# Patient Record
Sex: FEMALE | Race: WHITE | Employment: OTHER | URBAN - METROPOLITAN AREA
[De-identification: names, ages, dates, MRNs, and addresses within clinical notes are randomized per-mention and may not be internally consistent; named-entity substitution may affect disease eponyms.]

---

## 2017-01-10 ENCOUNTER — APPOINTMENT (OUTPATIENT)
Dept: ULTRASOUND IMAGING | Age: 22
End: 2017-01-10
Attending: EMERGENCY MEDICINE
Payer: MEDICAID

## 2017-01-10 ENCOUNTER — HOSPITAL ENCOUNTER (EMERGENCY)
Age: 22
Discharge: HOME | End: 2017-01-10
Attending: EMERGENCY MEDICINE
Payer: MEDICAID

## 2017-01-10 VITALS
SYSTOLIC BLOOD PRESSURE: 128 MMHG | RESPIRATION RATE: 16 BRPM | TEMPERATURE: 98.5 F | HEIGHT: 65 IN | DIASTOLIC BLOOD PRESSURE: 68 MMHG | OXYGEN SATURATION: 98 % | HEART RATE: 86 BPM | WEIGHT: 160 LBS | BODY MASS INDEX: 26.66 KG/M2

## 2017-01-10 DIAGNOSIS — R10.9 LEFT FLANK PAIN: ICD-10-CM

## 2017-01-10 DIAGNOSIS — N73.9 PELVIC INFLAMMATORY DISEASE: Primary | ICD-10-CM

## 2017-01-10 LAB
ALBUMIN SERPL BCP-MCNC: 3.9 G/DL (ref 3.5–5)
ALBUMIN/GLOB SERPL: 1.2 {RATIO} (ref 1.2–3.5)
ALP SERPL-CCNC: 76 U/L (ref 50–136)
ALT SERPL-CCNC: 24 U/L (ref 12–65)
ANION GAP BLD CALC-SCNC: 9 MMOL/L (ref 7–16)
AST SERPL W P-5'-P-CCNC: 13 U/L (ref 15–37)
BACTERIA URNS QL MICRO: 0 /HPF
BASOPHILS # BLD AUTO: 0 K/UL (ref 0–0.2)
BASOPHILS # BLD: 0 % (ref 0–2)
BILIRUB SERPL-MCNC: 0.3 MG/DL (ref 0.2–1.1)
BUN SERPL-MCNC: 11 MG/DL (ref 6–23)
CALCIUM SERPL-MCNC: 8.7 MG/DL (ref 8.3–10.4)
CASTS URNS QL MICRO: 0 /LPF
CHLORIDE SERPL-SCNC: 107 MMOL/L (ref 98–107)
CO2 SERPL-SCNC: 27 MMOL/L (ref 21–32)
CREAT SERPL-MCNC: 0.71 MG/DL (ref 0.6–1)
CRYSTALS URNS QL MICRO: 0 /LPF
DIFFERENTIAL METHOD BLD: ABNORMAL
EOSINOPHIL # BLD: 0.2 K/UL (ref 0–0.8)
EOSINOPHIL NFR BLD: 4 % (ref 0.5–7.8)
EPI CELLS #/AREA URNS HPF: NORMAL /HPF
ERYTHROCYTE [DISTWIDTH] IN BLOOD BY AUTOMATED COUNT: 13.8 % (ref 11.9–14.6)
GLOBULIN SER CALC-MCNC: 3.3 G/DL (ref 2.3–3.5)
GLUCOSE SERPL-MCNC: 96 MG/DL (ref 65–100)
HCT VFR BLD AUTO: 40.2 % (ref 35.8–46.3)
HGB BLD-MCNC: 13.6 G/DL (ref 11.7–15.4)
IMM GRANULOCYTES # BLD: 0 K/UL (ref 0–0.5)
IMM GRANULOCYTES NFR BLD AUTO: 0.6 % (ref 0–5)
LYMPHOCYTES # BLD AUTO: 36 % (ref 13–44)
LYMPHOCYTES # BLD: 1.9 K/UL (ref 0.5–4.6)
MCH RBC QN AUTO: 28.1 PG (ref 26.1–32.9)
MCHC RBC AUTO-ENTMCNC: 33.8 G/DL (ref 31.4–35)
MCV RBC AUTO: 83.1 FL (ref 79.6–97.8)
MONOCYTES # BLD: 0.3 K/UL (ref 0.1–1.3)
MONOCYTES NFR BLD AUTO: 6 % (ref 4–12)
MUCOUS THREADS URNS QL MICRO: 0 /LPF
NEUTS SEG # BLD: 2.8 K/UL (ref 1.7–8.2)
NEUTS SEG NFR BLD AUTO: 53 % (ref 43–78)
PLATELET # BLD AUTO: 246 K/UL (ref 150–450)
PMV BLD AUTO: 9.7 FL (ref 10.8–14.1)
POTASSIUM SERPL-SCNC: 3.8 MMOL/L (ref 3.5–5.1)
PROT SERPL-MCNC: 7.2 G/DL (ref 6.3–8.2)
RBC # BLD AUTO: 4.84 M/UL (ref 4.05–5.25)
RBC #/AREA URNS HPF: NORMAL /HPF
SERVICE CMNT-IMP: NORMAL
SODIUM SERPL-SCNC: 143 MMOL/L (ref 136–145)
WBC # BLD AUTO: 5.3 K/UL (ref 4.3–11.1)
WBC URNS QL MICRO: NORMAL /HPF
WET PREP GENITAL: NORMAL
WET PREP GENITAL: NORMAL

## 2017-01-10 PROCEDURE — 85025 COMPLETE CBC W/AUTO DIFF WBC: CPT | Performed by: EMERGENCY MEDICINE

## 2017-01-10 PROCEDURE — 74011250636 HC RX REV CODE- 250/636: Performed by: EMERGENCY MEDICINE

## 2017-01-10 PROCEDURE — 87491 CHLMYD TRACH DNA AMP PROBE: CPT | Performed by: EMERGENCY MEDICINE

## 2017-01-10 PROCEDURE — 99284 EMERGENCY DEPT VISIT MOD MDM: CPT | Performed by: EMERGENCY MEDICINE

## 2017-01-10 PROCEDURE — 74011000250 HC RX REV CODE- 250: Performed by: EMERGENCY MEDICINE

## 2017-01-10 PROCEDURE — 80053 COMPREHEN METABOLIC PANEL: CPT | Performed by: EMERGENCY MEDICINE

## 2017-01-10 PROCEDURE — 74011250637 HC RX REV CODE- 250/637: Performed by: EMERGENCY MEDICINE

## 2017-01-10 PROCEDURE — 36415 COLL VENOUS BLD VENIPUNCTURE: CPT | Performed by: EMERGENCY MEDICINE

## 2017-01-10 PROCEDURE — 76856 US EXAM PELVIC COMPLETE: CPT

## 2017-01-10 PROCEDURE — 81015 MICROSCOPIC EXAM OF URINE: CPT | Performed by: EMERGENCY MEDICINE

## 2017-01-10 PROCEDURE — 87210 SMEAR WET MOUNT SALINE/INK: CPT | Performed by: EMERGENCY MEDICINE

## 2017-01-10 PROCEDURE — 96372 THER/PROPH/DIAG INJ SC/IM: CPT | Performed by: EMERGENCY MEDICINE

## 2017-01-10 RX ORDER — TRAMADOL HYDROCHLORIDE 50 MG/1
50 TABLET ORAL
Qty: 20 TAB | Refills: 0 | Status: SHIPPED | OUTPATIENT
Start: 2017-01-10 | End: 2018-04-13 | Stop reason: ALTCHOICE

## 2017-01-10 RX ORDER — SODIUM CHLORIDE 0.9 % (FLUSH) 0.9 %
5-10 SYRINGE (ML) INJECTION EVERY 8 HOURS
Status: DISCONTINUED | OUTPATIENT
Start: 2017-01-10 | End: 2017-01-10 | Stop reason: HOSPADM

## 2017-01-10 RX ORDER — SODIUM CHLORIDE 0.9 % (FLUSH) 0.9 %
5-10 SYRINGE (ML) INJECTION AS NEEDED
Status: DISCONTINUED | OUTPATIENT
Start: 2017-01-10 | End: 2017-01-10 | Stop reason: HOSPADM

## 2017-01-10 RX ORDER — KETOROLAC TROMETHAMINE 30 MG/ML
30 INJECTION, SOLUTION INTRAMUSCULAR; INTRAVENOUS
Status: COMPLETED | OUTPATIENT
Start: 2017-01-10 | End: 2017-01-10

## 2017-01-10 RX ORDER — AZITHROMYCIN 250 MG/1
1000 TABLET, FILM COATED ORAL
Status: COMPLETED | OUTPATIENT
Start: 2017-01-10 | End: 2017-01-10

## 2017-01-10 RX ORDER — ONDANSETRON 8 MG/1
8 TABLET, ORALLY DISINTEGRATING ORAL
Status: COMPLETED | OUTPATIENT
Start: 2017-01-10 | End: 2017-01-10

## 2017-01-10 RX ADMIN — ONDANSETRON 8 MG: 8 TABLET, ORALLY DISINTEGRATING ORAL at 10:00

## 2017-01-10 RX ADMIN — CEFTRIAXONE SODIUM 250 MG: 250 INJECTION, POWDER, FOR SOLUTION INTRAMUSCULAR; INTRAVENOUS at 10:00

## 2017-01-10 RX ADMIN — AZITHROMYCIN 1000 MG: 250 TABLET, FILM COATED ORAL at 10:00

## 2017-01-10 RX ADMIN — KETOROLAC TROMETHAMINE 30 MG: 30 INJECTION, SOLUTION INTRAMUSCULAR; INTRAVENOUS at 10:03

## 2017-01-10 NOTE — ED TRIAGE NOTES
Pt arrives POV from home c/o L flank pain since this morning. Pt reports ongoing abd pain x 1 month and recent tx for PID. Denies hematuria.

## 2017-01-10 NOTE — ED PROVIDER NOTES
Patient is a 19 y.o. female presenting with flank pain. The history is provided by the patient. Flank Pain    This is a new problem. The current episode started 6 to 12 hours ago. The problem has not changed since onset. The problem occurs constantly. Patient reports not work related injury. The pain is associated with no known injury. The pain is present in the left side. The quality of the pain is described as sharp. The pain does not radiate. The pain is moderate. The symptoms are aggravated by certain positions. Associated symptoms include abdominal pain (chronic for several weeks). Pertinent negatives include no fever and no dysuria. Treatments tried: recently for PID. History reviewed. No pertinent past medical history. History reviewed. No pertinent past surgical history. History reviewed. No pertinent family history. Social History     Social History    Marital status: SINGLE     Spouse name: N/A    Number of children: N/A    Years of education: N/A     Occupational History    Not on file. Social History Main Topics    Smoking status: Current Every Day Smoker     Packs/day: 0.50    Smokeless tobacco: Not on file    Alcohol use Yes      Comment: occasional    Drug use: Not on file    Sexual activity: No     Other Topics Concern    Not on file     Social History Narrative    No narrative on file         ALLERGIES: Review of patient's allergies indicates no known allergies. Review of Systems   Constitutional: Negative for chills and fever. Gastrointestinal: Positive for abdominal pain (chronic for several weeks) and nausea. Negative for blood in stool and vomiting. Endocrine: Negative for polydipsia and polyuria. Genitourinary: Positive for flank pain. Negative for dysuria, hematuria and urgency.        Vitals:    01/10/17 0819   BP: 130/60   Pulse: 91   Resp: 16   Temp: 98.5 °F (36.9 °C)   SpO2: 99%   Weight: 72.6 kg (160 lb)   Height: 5' 5\" (1.651 m) Physical Exam   Constitutional: She appears well-developed and well-nourished. HENT:   Mouth/Throat: Oropharynx is clear and moist.   Eyes: Conjunctivae are normal.   Cardiovascular: Normal rate, regular rhythm, normal heart sounds and intact distal pulses. No murmur heard. Pulmonary/Chest: Breath sounds normal. No respiratory distress. Abdominal: Soft. She exhibits no distension. There is no tenderness. There is no rebound and no guarding. Musculoskeletal: Normal range of motion. She exhibits tenderness (left CVA and leftupper lumbar and lower thoracic paraspinous tenderness and pain noted with movement. ). She exhibits no edema. Neurological: She is alert. She has normal strength. No sensory deficit. Skin: Skin is warm and dry. Nursing note and vitals reviewed. MDM  Number of Diagnoses or Management Options  Diagnosis management comments: Subacute or chronic lower abdominal pain and more acute left-sided flank pain. Clinically patient had symptoms consistent with PID with a yellow cervical discharge and diffuse adnexal and pelvic tenderness and cervical motion tenderness. Patient was treated in the emergency department. No tubo-ovarian abscess was noted on ultrasound. Lonzell Balls was in the anatomical place in the uterus. There was no ovarian torsion.   No ovarian cyst.  The patient will need to follow up with her OB/GYN for consideration of Mirena IUD removal.       Amount and/or Complexity of Data Reviewed  Clinical lab tests: ordered and reviewed (Results for orders placed or performed during the hospital encounter of 01/10/17  -WET PREP       Result                                            Value                         Ref Range                       Special Requests:                                 NO SPECIAL REQUESTS                                           Wet prep                                                                                                    NO YEAST,TRICHOMONAS OR CLUE CELLS NOTED       Wet prep                                          0 TO 7 WBC'S PER HPF                                     -CBC WITH AUTOMATED DIFF       Result                                            Value                         Ref Range                       WBC                                               5.3                           4.3 - 11.1 K/uL                 RBC                                               4.84                          4.05 - 5.25 M/uL                HGB                                               13.6                          11.7 - 15.4 g/dL                HCT                                               40.2                          35.8 - 46.3 %                   MCV                                               83.1                          79.6 - 97.8 FL                  MCH                                               28.1                          26.1 - 32.9 PG                  MCHC                                              33.8                          31.4 - 35.0 g/dL                RDW                                               13.8                          11.9 - 14.6 %                   PLATELET                                          246                           150 - 450 K/uL                  MPV                                               9.7 (L)                       10.8 - 14.1 FL                  DF                                                AUTOMATED                                                     NEUTROPHILS                                       53                            43 - 78 %                       LYMPHOCYTES                                       36                            13 - 44 %                       MONOCYTES                                         6                             4.0 - 12.0 %                    EOSINOPHILS                                       4                             0.5 - 7.8 %                     BASOPHILS                                         0                             0.0 - 2.0 %                     IMMATURE GRANULOCYTES                             0.6                           0.0 - 5.0 %                     ABS. NEUTROPHILS                                  2.8                           1.7 - 8.2 K/UL                  ABS. LYMPHOCYTES                                  1.9                           0.5 - 4.6 K/UL                  ABS. MONOCYTES                                    0.3                           0.1 - 1.3 K/UL                  ABS. EOSINOPHILS                                  0.2                           0.0 - 0.8 K/UL                  ABS. BASOPHILS                                    0.0                           0.0 - 0.2 K/UL                  ABS. IMM.  GRANS.                                  0.0                           0.0 - 0.5 K/UL             -METABOLIC PANEL, COMPREHENSIVE       Result                                            Value                         Ref Range                       Sodium                                            143                           136 - 145 mmol/L                Potassium                                         3.8                           3.5 - 5.1 mmol/L                Chloride                                          107                           98 - 107 mmol/L                 CO2                                               27                            21 - 32 mmol/L                  Anion gap                                         9                             7 - 16 mmol/L                   Glucose                                           96                            65 - 100 mg/dL                  BUN                                               11                            6 - 23 MG/DL                    Creatinine                                        0.71                          0.6 - 1.0 MG/DL GFR est AA                                        >60                           >60 ml/min/1.73m2               GFR est non-AA                                    >60                           >60 ml/min/1.73m2               Calcium                                           8.7                           8.3 - 10.4 MG/DL                Bilirubin, total                                  0.3                           0.2 - 1.1 MG/DL                 ALT                                               24                            12 - 65 U/L                     AST                                               13 (L)                        15 - 37 U/L                     Alk. phosphatase                                  76                            50 - 136 U/L                    Protein, total                                    7.2                           6.3 - 8.2 g/dL                  Albumin                                           3.9                           3.5 - 5.0 g/dL                  Globulin                                          3.3                           2.3 - 3.5 g/dL                  A-G Ratio                                         1.2                           1.2 - 3.5                  -URINE MICROSCOPIC       Result                                            Value                         Ref Range                       WBC                                               5-10                          0 /hpf                          RBC                                               3-5                           0 /hpf                          Epithelial cells                                  3-5                           0 /hpf                          Bacteria                                          0                             0 /hpf                          Casts                                             0                             0 /lpf                          Crystals 0                             0 /LPF                          Mucus                                             0                             0 /lpf                     )  Tests in the radiology section of CPT®: ordered and reviewed (Us Pelv Non Ob W Tv    Result Date: 1/10/2017  Examination: Transvaginal ultrasound of the pelvis for optimal visualization of the ovaries and uterus. Transabdominal ultrasound for evaluation of the remainder of the pelvic contents. History: pelvic, LLQ abd pain, PID-TOA? Ovarian torsion or cyst?, 21 years Female; Patient's last menstrual period was January 01, 2017. Negative beta hCG. Pelvic pain greater on the left. Comparison: None available Findings: Uterus measures approximately 9.9 x 4.0 x 5.6 cm. Myometrium has a background normal uniform echotexture. Endometrial stripe measures approximately 0.6 cm. An intrauterine device appears in anatomic position within the endometrial cavity. The right and left ovary were well-visualized. Right ovary measures approximately 3.9 x 1.5 x 2.5 cm, and left ovary measures approximately 2.8 x 3.7 x 1.7 cm. Normal Doppler flow in bilateral ovaries. No free fluid was seen within the cul-de-sac. Impression:  Intrauterine device appears in anatomic position within the endometrial cavity. Otherwise normal pelvic ultrasound.       )      ED Course       Procedures

## 2017-01-10 NOTE — ED NOTES
This RN to assume care of this pt. Pt resting in bed at this time. NO acute distress noted. Pt filled her bladder and feels need to urinate. To go to [facility] as soon as they come for her.

## 2017-01-10 NOTE — DISCHARGE INSTRUCTIONS
Pelvic Inflammatory Disease: Care Instructions  Your Care Instructions    Pelvic inflammatory disease, or PID, is an infection of a womans fallopian tubes and other reproductive organs. PID is usually caused by a sexually transmitted infection (STI), such as gonorrhea or chlamydia. PID can cause scars in the fallopian tubes, making it hard for a woman to get pregnant. Having one STI increases your risk for other STIs, such as genital herpes, genital warts, syphilis, and HIV. It is important to take all the medicine that was prescribed. PID can cause serious health problems if you do not complete your treatment. Follow-up care is a key part of your treatment and safety. Be sure to make and go to all appointments, and call your doctor if you are having problems. Its also a good idea to know your test results and keep a list of the medicines you take. How can you care for yourself at home? · Take your antibiotics as directed. Do not stop taking them just because you feel better. You need to take the full course of antibiotics. · Rest until your fever and pain have improved. · Take pain medicines exactly as directed. ¨ If the doctor gave you a prescription medicine for pain, take it as prescribed. ¨ If you are not taking a prescription pain medicine, ask your doctor if you can take an over-the-counter medicine. · Use a hot water bottle or a heating pad (set on low) on your belly for pain. · Do not douche. · Do not have sex or use tampons (you can use pads instead) until you have taken all the medicine, your pain is gone, and you feel completely well. · Talk to any sex partners you have had in the past 2 months. They need to be tested and may need to be treated for STIs. To prevent STIs  · Use latex condoms every time you have sex. Use them from the beginning to the end of sexual contact. · Talk to your partner before you have sex.  Find out if he or she has or is at risk for any sexually transmitted infection (STI). Keep in mind that a person may be able to spread an STI even if he or she does not have symptoms. · Do not have sex with anyone who has symptoms of an STI, such as sores on the genitals or mouth. · Having one sex partner (who does not have STIs and does not have sex with anyone else) is a good way to avoid STIs. When should you call for help? Call your doctor now or seek immediate medical care if:  · You have a new or higher fever. · Your pain gets worse. · You think you may be pregnant. Watch closely for changes in your health, and be sure to contact your doctor if:  · You vomit or have diarrhea. · You are not getting better after 2 days. Where can you learn more? Go to http://jeferson-jordan.info/. Enter N294 in the search box to learn more about \"Pelvic Inflammatory Disease: Care Instructions. \"  Current as of: February 25, 2016  Content Version: 11.1  © 6706-2426 [employer]. Care instructions adapted under license by [employer] (which disclaims liability or warranty for this information). If you have questions about a medical condition or this instruction, always ask your healthcare professional. [address] any warranty or liability for your use of this information. Abdominal Pain: Care Instructions  Your Care Instructions    Abdominal pain has many possible causes. Some aren't serious and get better on their own in a few days. Others need more testing and treatment. If your pain continues or gets worse, you need to be rechecked and may need more tests to find out what is wrong. You may need surgery to correct the problem. Don't ignore new symptoms, such as fever, nausea and vomiting, urination problems, pain that gets worse, and dizziness. These may be signs of a more serious problem. Your doctor may have recommended a follow-up visit in the next 8 to 12 hours.  If you are not getting better, you may need more tests or treatment. The doctor has checked you carefully, but problems can develop later. If you notice any problems or new symptoms, get medical treatment right away. Follow-up care is a key part of your treatment and safety. Be sure to make and go to all appointments, and call your doctor if you are having problems. It's also a good idea to know your test results and keep a list of the medicines you take. How can you care for yourself at home? · Rest until you feel better. · To prevent dehydration, drink plenty of fluids, enough so that your urine is light yellow or clear like water. Choose water and other caffeine-free clear liquids until you feel better. If you have kidney, heart, or liver disease and have to limit fluids, talk with your doctor before you increase the amount of fluids you drink. · If your stomach is upset, eat mild foods, such as rice, dry toast or crackers, bananas, and applesauce. Try eating several small meals instead of two or three large ones. · Wait until 48 hours after all symptoms have gone away before you have spicy foods, alcohol, and drinks that contain caffeine. · Do not eat foods that are high in fat. · Avoid anti-inflammatory medicines such as aspirin, ibuprofen (Advil, Motrin), and naproxen (Aleve). These can cause stomach upset. Talk to your doctor if you take daily aspirin for another health problem. When should you call for help? Call 911 anytime you think you may need emergency care. For example, call if:  · You passed out (lost consciousness). · You pass maroon or very bloody stools. · You vomit blood or what looks like coffee grounds. · You have new, severe belly pain. Call your doctor now or seek immediate medical care if:  · Your pain gets worse, especially if it becomes focused in one area of your belly. · You have a new or higher fever. · Your stools are black and look like tar, or they have streaks of blood.   · You have unexpected vaginal bleeding. · You have symptoms of a urinary tract infection. These may include:  ¨ Pain when you urinate. ¨ Urinating more often than usual.  ¨ Blood in your urine. · You are dizzy or lightheaded, or you feel like you may faint. Watch closely for changes in your health, and be sure to contact your doctor if:  · You are not getting better after 1 day (24 hours). Where can you learn more? Go to http://jeferson-jordan.info/. Enter D715 in the search box to learn more about \"Abdominal Pain: Care Instructions. \"  Current as of: May 27, 2016  Content Version: 11.1  © 3030-7772 [employer]. Care instructions adapted under license by [employer] (which disclaims liability or warranty for this information). If you have questions about a medical condition or this instruction, always ask your healthcare professional. [facility] [address] any warranty or liability for your use of this information.

## 2017-01-10 NOTE — LETTER
NOTIFICATION RETURN TO WORK / SCHOOL 
 
1/11/2017 2:27 PM 
 
Ms. Janny Mackenzie [address] [facility] [id number] To Whom It May Concern: 
 
Janny Mackenzie is currently under the care of [facility] EMERGENCY DEPT 1/10/2016. She will return to work/school on: 1/12/2016 If there are questions or concerns please have the patient contact our office. Sincerely, Esperanza Cooney RN

## 2017-01-11 LAB
C TRACH RRNA SPEC QL NAA+PROBE: NEGATIVE
N GONORRHOEA RRNA SPEC QL NAA+PROBE: NEGATIVE
SPECIMEN SOURCE: NORMAL

## 2018-04-13 PROBLEM — F41.1 GAD (GENERALIZED ANXIETY DISORDER): Status: ACTIVE | Noted: 2018-04-13

## 2018-04-13 PROBLEM — F33.9 DEPRESSION, RECURRENT (HCC): Status: ACTIVE | Noted: 2018-04-13

## 2018-04-13 PROBLEM — Z00.00 ANNUAL PHYSICAL EXAM: Status: ACTIVE | Noted: 2018-04-13

## 2018-07-02 PROBLEM — Z30.9 CONTRACEPTION MANAGEMENT: Status: ACTIVE | Noted: 2018-07-02

## 2018-07-02 PROBLEM — G47.00 INSOMNIA: Status: ACTIVE | Noted: 2018-07-02

## 2019-05-09 PROBLEM — R10.9 CHRONIC ABDOMINAL PAIN: Status: ACTIVE | Noted: 2019-05-09

## 2019-05-09 PROBLEM — G89.29 CHRONIC ABDOMINAL PAIN: Status: ACTIVE | Noted: 2019-05-09

## 2019-05-09 PROBLEM — K52.9 CHRONIC DIARRHEA: Status: ACTIVE | Noted: 2019-05-09

## 2019-07-17 PROBLEM — G54.0 THORACIC OUTLET SYNDROME: Status: ACTIVE | Noted: 2019-07-17

## 2020-04-07 PROBLEM — F41.0 PANIC ANXIETY SYNDROME: Status: ACTIVE | Noted: 2018-04-13

## 2020-04-07 PROBLEM — K21.9 GERD (GASTROESOPHAGEAL REFLUX DISEASE): Status: ACTIVE | Noted: 2020-04-07

## 2020-04-07 PROBLEM — F39 MOOD DISORDER (HCC): Status: ACTIVE | Noted: 2018-04-13

## 2020-12-02 PROBLEM — F32.9 MDD (MAJOR DEPRESSIVE DISORDER): Status: ACTIVE | Noted: 2018-04-13

## 2021-07-07 PROBLEM — R11.0 CHRONIC NAUSEA: Status: ACTIVE | Noted: 2021-07-07

## 2021-07-22 PROBLEM — Z30.9 CONTRACEPTION MANAGEMENT: Status: RESOLVED | Noted: 2018-07-02 | Resolved: 2021-07-22

## 2021-07-22 PROBLEM — L72.0 EPIDERMAL CYST: Status: ACTIVE | Noted: 2021-07-22

## 2021-07-22 PROBLEM — Z01.419 ENCOUNTER FOR ANNUAL ROUTINE GYNECOLOGICAL EXAMINATION: Status: ACTIVE | Noted: 2021-07-22

## 2021-07-22 PROBLEM — R35.0 URINARY FREQUENCY: Status: ACTIVE | Noted: 2021-07-22

## 2021-08-04 ENCOUNTER — TRANSCRIBE ORDER (OUTPATIENT)
Dept: SCHEDULING | Age: 26
End: 2021-08-04

## 2021-08-04 DIAGNOSIS — R10.13 EPIGASTRIC ABDOMINAL PAIN: Primary | ICD-10-CM

## 2021-08-04 DIAGNOSIS — Z83.79 FAMILY HISTORY OF GALLBLADDER DISEASE: ICD-10-CM

## 2021-08-04 DIAGNOSIS — A04.8 HELICOBACTER PYLORI INFECTION: ICD-10-CM

## 2021-08-26 ENCOUNTER — HOSPITAL ENCOUNTER (OUTPATIENT)
Dept: NUCLEAR MEDICINE | Age: 26
Discharge: HOME | End: 2021-08-26
Attending: INTERNAL MEDICINE
Payer: MEDICAID

## 2021-08-26 DIAGNOSIS — Z83.79 FAMILY HISTORY OF GALLBLADDER DISEASE: ICD-10-CM

## 2021-08-26 DIAGNOSIS — A04.8 HELICOBACTER PYLORI INFECTION: ICD-10-CM

## 2021-08-26 DIAGNOSIS — R10.13 EPIGASTRIC ABDOMINAL PAIN: ICD-10-CM

## 2021-08-26 PROCEDURE — 78227 HEPATOBIL SYST IMAGE W/DRUG: CPT

## 2022-01-10 PROBLEM — N89.8 VAGINAL DISCHARGE: Status: ACTIVE | Noted: 2022-01-10

## 2022-01-10 PROBLEM — R82.90 MALODOROUS URINE: Status: ACTIVE | Noted: 2022-01-10

## 2022-03-18 PROBLEM — F32.9 MDD (MAJOR DEPRESSIVE DISORDER): Status: ACTIVE | Noted: 2018-04-13

## 2022-03-18 PROBLEM — N89.8 VAGINAL DISCHARGE: Status: ACTIVE | Noted: 2022-01-10

## 2022-03-18 PROBLEM — Z00.00 ANNUAL PHYSICAL EXAM: Status: ACTIVE | Noted: 2018-04-13

## 2022-03-18 PROBLEM — R35.0 URINARY FREQUENCY: Status: ACTIVE | Noted: 2021-07-22

## 2022-03-18 PROBLEM — K21.9 GERD (GASTROESOPHAGEAL REFLUX DISEASE): Status: ACTIVE | Noted: 2020-04-07

## 2022-03-19 PROBLEM — G89.29 CHRONIC ABDOMINAL PAIN: Status: ACTIVE | Noted: 2019-05-09

## 2022-03-19 PROBLEM — R11.0 CHRONIC NAUSEA: Status: ACTIVE | Noted: 2021-07-07

## 2022-03-19 PROBLEM — L72.0 EPIDERMAL CYST: Status: ACTIVE | Noted: 2021-07-22

## 2022-03-19 PROBLEM — R10.9 CHRONIC ABDOMINAL PAIN: Status: ACTIVE | Noted: 2019-05-09

## 2022-03-19 PROBLEM — F41.0 PANIC ANXIETY SYNDROME: Status: ACTIVE | Noted: 2018-04-13

## 2022-03-19 PROBLEM — Z01.419 ENCOUNTER FOR ANNUAL ROUTINE GYNECOLOGICAL EXAMINATION: Status: ACTIVE | Noted: 2021-07-22

## 2022-03-19 PROBLEM — K52.9 CHRONIC DIARRHEA: Status: ACTIVE | Noted: 2019-05-09

## 2022-03-19 PROBLEM — R82.90 MALODOROUS URINE: Status: ACTIVE | Noted: 2022-01-10

## 2022-03-19 PROBLEM — G54.0 TOS (THORACIC OUTLET SYNDROME): Status: ACTIVE | Noted: 2019-07-17

## 2022-03-20 PROBLEM — G47.00 INSOMNIA: Status: ACTIVE | Noted: 2018-07-02

## 2022-06-09 DIAGNOSIS — Z00.00 ANNUAL PHYSICAL EXAM: Primary | ICD-10-CM

## 2022-08-04 DIAGNOSIS — Z00.00 ANNUAL PHYSICAL EXAM: ICD-10-CM

## 2022-08-24 ENCOUNTER — TELEPHONE (OUTPATIENT)
Dept: FAMILY MEDICINE CLINIC | Facility: CLINIC | Age: 27
End: 2022-08-24

## 2022-08-24 ENCOUNTER — TELEMEDICINE (OUTPATIENT)
Dept: FAMILY MEDICINE CLINIC | Facility: CLINIC | Age: 27
End: 2022-08-24
Payer: MEDICAID

## 2022-08-24 DIAGNOSIS — K52.9 CHRONIC DIARRHEA: ICD-10-CM

## 2022-08-24 DIAGNOSIS — R11.0 CHRONIC NAUSEA: Primary | ICD-10-CM

## 2022-08-24 DIAGNOSIS — G89.29 CHRONIC ABDOMINAL PAIN: ICD-10-CM

## 2022-08-24 DIAGNOSIS — R10.9 CHRONIC ABDOMINAL PAIN: ICD-10-CM

## 2022-08-24 PROBLEM — N89.8 VAGINAL DISCHARGE: Status: RESOLVED | Noted: 2022-01-10 | Resolved: 2022-08-24

## 2022-08-24 PROBLEM — R82.90 MALODOROUS URINE: Status: RESOLVED | Noted: 2022-01-10 | Resolved: 2022-08-24

## 2022-08-24 PROBLEM — R35.0 URINARY FREQUENCY: Status: RESOLVED | Noted: 2021-07-22 | Resolved: 2022-08-24

## 2022-08-24 PROBLEM — Z01.419 ENCOUNTER FOR ANNUAL ROUTINE GYNECOLOGICAL EXAMINATION: Status: RESOLVED | Noted: 2021-07-22 | Resolved: 2022-08-24

## 2022-08-24 PROCEDURE — 99214 OFFICE O/P EST MOD 30 MIN: CPT | Performed by: FAMILY MEDICINE

## 2022-08-24 RX ORDER — PROMETHAZINE HYDROCHLORIDE 12.5 MG/1
25 TABLET ORAL EVERY 8 HOURS PRN
Qty: 90 TABLET | Refills: 1 | Status: SHIPPED | OUTPATIENT
Start: 2022-08-24

## 2022-08-24 RX ORDER — ONDANSETRON 4 MG/1
4 TABLET, FILM COATED ORAL EVERY 8 HOURS PRN
Qty: 90 TABLET | Refills: 1 | Status: SHIPPED | OUTPATIENT
Start: 2022-08-24

## 2022-08-24 ASSESSMENT — PATIENT HEALTH QUESTIONNAIRE - PHQ9
SUM OF ALL RESPONSES TO PHQ QUESTIONS 1-9: 0
2. FEELING DOWN, DEPRESSED OR HOPELESS: 0
SUM OF ALL RESPONSES TO PHQ9 QUESTIONS 1 & 2: 0
SUM OF ALL RESPONSES TO PHQ QUESTIONS 1-9: 0
1. LITTLE INTEREST OR PLEASURE IN DOING THINGS: 0

## 2022-08-24 NOTE — TELEPHONE ENCOUNTER
That is fine; it makes no difference whether she takes two 12.5 mg tablets or one 25 mg tablet; it's the same med & dosage either way.  PLEASE tell pharmacy to just do that & not \"require\" a brand new script for something so inconsequential.

## 2022-08-24 NOTE — TELEPHONE ENCOUNTER
Pharmacy called and stated the insurance will not pay for the promethazine 12.5 mg 2 tabs every 8 hours. Can they change it to 25mg 1 tab every 8 hours. Insurance will pay for that.

## 2022-08-24 NOTE — PROGRESS NOTES
[address], [address] [place] [address]  Phone: [phone]  Fax: [phone]  [email or web address]      Encounter Michael Avila; Established patient 27 y. o.female; seen 8/24/2022 for: Emesis (Since Friday last week. Did get worse today, and woke up with a Head ache and feeling run down.)      Assessment & Plan    1. Chronic nausea  -     ondansetron (ZOFRAN) 4 MG tablet; Take 1 tablet by mouth every 8 hours as needed for Vomiting, Disp-90 tablet, R-1Normal  -     promethazine (PHENERGAN) 12.5 MG tablet; Take 2 tablets by mouth every 8 hours as needed for Nausea, Disp-90 tablet, R-1Normal  2. Chronic diarrhea  3. Chronic abdominal pain    Problem and/or Symptoms are currently not stable and/or well controlled on current treatment plan. Will have patient follow up as directed and make the following changes for further evaluation and/or treatment:     RF Zofran & Phenergan to use TID PRN; advised pt to get rapid COVID & rapid Flu test & to let us know if either are positive. Check Out Instructions  Return for Next Scheduled. Subjective & Objective    HPI  Problem and/or Symptoms are currently not stable and/or well controlled on current treatment plan. Will have patient follow up as directed and make the following changes for further evaluation and/or treatment:     Sx of N/V/D since last Friday; pt reports that there are multiple people at her work out sick right now with similar Sx. No rapid COVID or Flu test results. Does have ab it of a non-productive cough & fatigue/malaise.      Review of Systems     Physical Exam  Patient-Reported Vitals 8/24/2022   Patient-Reported Weight 147   Patient-Reported Height 55       BP Readings from Last 3 Encounters:   04/21/22 104/70   01/10/22 104/70   07/07/21 103/62     Wt Readings from Last 3 Encounters:   04/21/22 161 lb (73 kg)   01/10/22 170 lb (77.1 kg)   07/07/21 161 lb (73 kg)     There is no height or weight on file to calculate BMI. We discussed the typical prognosis and potential complications of the concern(s), including treatment options. Medication risks, benefits, costs, interactions, and alternatives were discussed as appropriate. I advised her to contact the office if her condition worsens or fails to improve as anticipated. She expressed understanding with the discussion and plan of care. Reyna Sweeney, was evaluated through a synchronous (real-time) audio and/or video encounter. The patient (or guardian if applicable) is aware that this is a billable service, which includes applicable co-pays. This Virtual Visit was conducted with patient's (and/or legal guardian's) consent. The visit was conducted pursuant to the emergency declaration under the [address] [place] [address] authority and the [employer] and [employer] General Act. Patient identification was verified, and a caregiver was present when appropriate. The patient was located at Home: [place] [id number]. Provider was located at [facility] (Appt Dept): [id number] [place] [address]. An electronic signature was used to authenticate this note.   -- Michelle Avalos MD

## 2022-08-24 NOTE — LETTER
[address] [address]  Phone: [phone]  Fax: [phone]    Deb Palmer MD        August 24, 2022     Patient: Paul Coronado   YOB: 1995   Date of Visit: 8/24/2022       To Whom It May Concern: It is my medical opinion that [place] may return to work on Thursday, Sept 25th. If you have any questions or concerns, please don't hesitate to call.     Sincerely,        Deb Palmer MD

## 2022-08-29 ENCOUNTER — TELEMEDICINE (OUTPATIENT)
Dept: FAMILY MEDICINE CLINIC | Facility: CLINIC | Age: 27
End: 2022-08-29
Payer: MEDICAID

## 2022-08-29 DIAGNOSIS — G89.29 CHRONIC ABDOMINAL PAIN: Primary | ICD-10-CM

## 2022-08-29 DIAGNOSIS — R11.0 CHRONIC NAUSEA: ICD-10-CM

## 2022-08-29 DIAGNOSIS — R51.9 NONINTRACTABLE HEADACHE, UNSPECIFIED CHRONICITY PATTERN, UNSPECIFIED HEADACHE TYPE: ICD-10-CM

## 2022-08-29 DIAGNOSIS — R10.9 CHRONIC ABDOMINAL PAIN: Primary | ICD-10-CM

## 2022-08-29 DIAGNOSIS — K52.9 CHRONIC DIARRHEA: ICD-10-CM

## 2022-08-29 PROCEDURE — 99214 OFFICE O/P EST MOD 30 MIN: CPT | Performed by: FAMILY MEDICINE

## 2022-08-29 RX ORDER — DIPHENOXYLATE HYDROCHLORIDE AND ATROPINE SULFATE 2.5; .025 MG/1; MG/1
1-2 TABLET ORAL 4 TIMES DAILY PRN
Qty: 80 TABLET | Refills: 1 | Status: SHIPPED | OUTPATIENT
Start: 2022-08-29 | End: 2023-08-30

## 2022-08-29 NOTE — LETTER
[facility] [address]  Phone: [phone]  Fax: [phone]    Ally Corriagn MD        August 29, 2022     Patient: Rolo Mendoza   YOB: 1995   Date of Visit: 8/29/2022       To Whom It May Concern: It is my medical opinion that [place] should work from home up through Monday, August 29th, and return to work in person starting Tuesday, August 30th. If you have any questions or concerns, please don't hesitate to call.     Sincerely,        Ally Corrigan MD

## 2022-08-29 NOTE — PROGRESS NOTES
[facility]  [address] [place] [address], [address] [place] [address]  Phone: [phone]  Fax: [phone]  [email or web address]      Encounter Michael Avila; Established patient 27 y. o.female; seen 8/29/2022 for: Other (Stomach issues (work excuse) already seeing GI )      Assessment & Plan    1. Chronic abdominal pain  2. Chronic diarrhea  -     diphenoxylate-atropine (LOMOTIL) 2.5-0.025 MG per tablet; Take 1-2 tablets by mouth 4 times daily as needed for Diarrhea., Disp-80 tablet, R-1Normal  3. Chronic nausea  4. Nonintractable headache, unspecified chronicity pattern, unspecified headache type    Problem and/or Symptoms are currently not stable and/or well controlled on current treatment plan. Will have patient follow up as directed and make the following changes for further evaluation and/or treatment:     Advised pt to f/u with GI ASAP, and to avoid Zofran for next few days since this can sometimes exacerbate headaches. Also encourage patient to try to increase her fluid intake since dehydration can also worsen headache syndrome. Also providing her with stronger antidiarrheal medication Lomotil and advised her to continue with Phenergan for nausea control for now. Check Out Instructions  Return for Physical, Previsit Labs. Subjective & Objective    HPI  Problem and/or Symptoms are currently not stable and/or well controlled on current treatment plan. Will have patient follow up as directed and make the following changes for further evaluation and/or treatment:     Pt seeing GI but needs a f/u visit; has continued to have GI issues with abdominal pain, N/V, & diarrhea.      Review of Systems     Physical Exam  Patient-Reported Vitals 8/29/2022   Patient-Reported Weight 147   Patient-Reported Height 5'5\"       BP Readings from Last 3 Encounters:   04/21/22 104/70   01/10/22 104/70   07/07/21 103/62     Wt Readings from Last 3 Encounters:   04/21/22 161 lb (73 kg)   01/10/22 170 lb (77.1 kg)   07/07/21 161 lb (73 kg)     There is no height or weight on file to calculate BMI. We discussed the typical prognosis and potential complications of the concern(s), including treatment options. Medication risks, benefits, costs, interactions, and alternatives were discussed as appropriate. I advised her to contact the office if her condition worsens or fails to improve as anticipated. She expressed understanding with the discussion and plan of care. Pablo Garcia, was evaluated through a synchronous (real-time) audio and/or video encounter. The patient (or guardian if applicable) is aware that this is a billable service, which includes applicable co-pays. This Virtual Visit was conducted with patient's (and/or legal guardian's) consent. The visit was conducted pursuant to the emergency declaration under the [address] [place] [address], [address] [place] [address] authority and the [employer] and [employer] General Act. Patient identification was verified, and a caregiver was present when appropriate. The patient was located at Home: [place] [phone]. Provider was located at [facility] (Appt Dept): [id number] [place] [address]. An electronic signature was used to authenticate this note.   -- Danis Garcia MD

## 2022-11-01 RX ORDER — ONDANSETRON 4 MG/1
TABLET, ORALLY DISINTEGRATING ORAL
COMMUNITY
Start: 2022-10-06

## 2022-11-01 SDOH — ECONOMIC STABILITY: FOOD INSECURITY: WITHIN THE PAST 12 MONTHS, THE FOOD YOU BOUGHT JUST DIDN'T LAST AND YOU DIDN'T HAVE MONEY TO GET MORE.: NEVER TRUE

## 2022-11-01 SDOH — ECONOMIC STABILITY: FOOD INSECURITY: WITHIN THE PAST 12 MONTHS, YOU WORRIED THAT YOUR FOOD WOULD RUN OUT BEFORE YOU GOT MONEY TO BUY MORE.: NEVER TRUE

## 2022-11-01 ASSESSMENT — SOCIAL DETERMINANTS OF HEALTH (SDOH): HOW HARD IS IT FOR YOU TO PAY FOR THE VERY BASICS LIKE FOOD, HOUSING, MEDICAL CARE, AND HEATING?: NOT HARD AT ALL

## 2022-11-02 ENCOUNTER — TELEMEDICINE (OUTPATIENT)
Dept: FAMILY MEDICINE CLINIC | Facility: CLINIC | Age: 27
End: 2022-11-02
Payer: MEDICAID

## 2022-11-02 DIAGNOSIS — F33.9 RECURRENT MAJOR DEPRESSIVE DISORDER, REMISSION STATUS UNSPECIFIED (HCC): Primary | ICD-10-CM

## 2022-11-02 DIAGNOSIS — F41.0 PANIC ANXIETY SYNDROME: ICD-10-CM

## 2022-11-02 PROCEDURE — 99214 OFFICE O/P EST MOD 30 MIN: CPT | Performed by: FAMILY MEDICINE

## 2022-11-02 RX ORDER — SERTRALINE HYDROCHLORIDE 25 MG/1
TABLET, FILM COATED ORAL
Qty: 42 TABLET | Refills: 0 | Status: SHIPPED | OUTPATIENT
Start: 2022-11-02 | End: 2022-11-23

## 2022-11-02 RX ORDER — SERTRALINE HYDROCHLORIDE 100 MG/1
100 TABLET, FILM COATED ORAL DAILY
Qty: 30 TABLET | Refills: 2 | Status: SHIPPED | OUTPATIENT
Start: 2022-11-23

## 2022-11-02 NOTE — PROGRESS NOTES
[address]  Phone: [phone]  Fax: [phone]  [email or web address]      Encounter Michael Avila; Established patient 27 y. o.female; seen 11/2/2022 for: Depression, Anxiety, Discuss Labs, and Discuss Medications      Assessment & Plan    1. Recurrent major depressive disorder, remission status unspecified (HCC)  -     sertraline (ZOLOFT) 25 MG tablet; 1 tab/day X 1 week, then 2 tabs/day X 1 week, then 3 tabs/day X 1 week, the switch to single 100 mg pill per day from then on, Disp-42 tablet, R-0Normal  -     sertraline (ZOLOFT) 100 MG tablet; Take 1 tablet by mouth daily, Disp-30 tablet, R-2Normal  2. Panic anxiety syndrome  -     sertraline (ZOLOFT) 25 MG tablet; 1 tab/day X 1 week, then 2 tabs/day X 1 week, then 3 tabs/day X 1 week, the switch to single 100 mg pill per day from then on, Disp-42 tablet, R-0Normal  -     sertraline (ZOLOFT) 100 MG tablet; Take 1 tablet by mouth daily, Disp-30 tablet, R-2Normal    Problem and/or Symptoms are currently not stable and/or well controlled on current treatment plan. Will have patient follow up as directed and make the following changes for further evaluation and/or treatment:     Switching Cymbalta to Zoloft & f/u response in 3 M      Check Out Instructions  Return in about 3 months (around 2/2/2023) for Physical, Previsit Labs. Subjective & Objective    HPI  Pt struggling with DENISE & MDD; Cymbalta not working well for her but her brother does well with Zoloft, so pt would like to switch to that if possible.      Review of Systems     Physical Exam  Patient-Reported Vitals 11/2/2022   Patient-Reported Weight 145   Patient-Reported Height 55   Patient-Reported Temperature 98.7   Patient-Reported SpO2 100       BP Readings from Last 3 Encounters:   04/21/22 104/70   01/10/22 104/70   07/07/21 103/62     Wt Readings from Last 3 Encounters:   04/21/22 161 lb (73 kg)   01/10/22 170 lb (77.1 kg)   07/07/21 161 lb (73 kg)     There is no height or weight on file to calculate BMI. We discussed the typical prognosis and potential complications of the concern(s), including treatment options. Medication risks, benefits, costs, interactions, and alternatives were discussed as appropriate. I advised her to contact the office if her condition worsens or fails to improve as anticipated. She expressed understanding with the discussion and plan of care. Hal Gonis, was evaluated through a synchronous (real-time) audio and/or video encounter. The patient (or guardian if applicable) is aware that this is a billable service, which includes applicable co-pays. This Virtual Visit was conducted with patient's (and/or legal guardian's) consent. The visit was conducted pursuant to the emergency declaration under the [address] [place] [address], [address] [place] [address] [facility] authority and the [employer] and [employer] General Act. Patient identification was verified, and a caregiver was present when appropriate. The patient was located at Home: [place] [phone]. Provider was located at [facility] (Appt Dept): [address] [place] [address]. An electronic signature was used to authenticate this note.   -- Carrie Hopson MD

## 2022-11-02 NOTE — Clinical Note
Please make sure pt is scheduled for in office CPX with pre-visit labs in 3 M; that was the intent for her visit today but she was scheduled for a virtual visit with no pre-visit labs?  jose armandos

## 2022-12-02 ENCOUNTER — TELEPHONE (OUTPATIENT)
Dept: FAMILY MEDICINE CLINIC | Facility: CLINIC | Age: 27
End: 2022-12-02

## 2022-12-29 ENCOUNTER — TELEPHONE (OUTPATIENT)
Dept: FAMILY MEDICINE CLINIC | Facility: CLINIC | Age: 27
End: 2022-12-29

## 2022-12-29 NOTE — TELEPHONE ENCOUNTER
Pt requesting refill of Klonopin sent to the [facility] in [facility].

## 2023-01-03 ENCOUNTER — TELEPHONE (OUTPATIENT)
Dept: FAMILY MEDICINE CLINIC | Facility: CLINIC | Age: 28
End: 2023-01-03

## 2023-01-24 ENCOUNTER — TELEMEDICINE (OUTPATIENT)
Dept: FAMILY MEDICINE CLINIC | Facility: CLINIC | Age: 28
End: 2023-01-24
Payer: MEDICAID

## 2023-01-24 DIAGNOSIS — F41.0 PANIC ANXIETY SYNDROME: Primary | ICD-10-CM

## 2023-01-24 PROCEDURE — 99213 OFFICE O/P EST LOW 20 MIN: CPT | Performed by: FAMILY MEDICINE

## 2023-01-24 RX ORDER — CLONAZEPAM 1 MG/1
1 TABLET ORAL DAILY PRN
Qty: 30 TABLET | Refills: 2 | Status: SHIPPED | OUTPATIENT
Start: 2023-01-24 | End: 2023-04-24

## 2023-01-24 NOTE — PROGRESS NOTES
Pattie Kebede is a 32 y.o. female who was seen by synchronous (real-time) audio-video technology on 1/24/2023. Subjective:   Pattie Kebede was seen for Anxiety  3 mo f/u anxiety and MDD  Cymbalta was changed to zoloft at Nov appt. Currently taking zoloft 75mg, hasn't gotten up to 100mg yet. Klonopin 1 mg #60 last filled 10/17. Hasn't needed it since leaving her job in Nov but likes to have it on hand      Allergies   Allergen Reactions    Metoprolol Other (See Comments)     Shaky, unable to walk         Objective:     General: alert, cooperative, and no distress   Mental  status: mental status: alert, oriented to person, place, and time, normal mood, behavior, speech, dress, motor activity, and thought processes   Resp: No distress. Neuro: No acute deficits   Skin: skin: no discoloration or lesions of concern on visible areas     Due to this being a TeleHealth evaluation, many elements of the physical examination are unable to be assessed. Assessment & Plan:   [facility] was seen today for anxiety. Diagnoses and all orders for this visit:    Panic anxiety syndrome  -     clonazePAM (KLONOPIN) 1 MG tablet; Take 1 tablet by mouth daily as needed for Anxiety for up to 90 days. Max Daily Amount: 1 mg    Cont titrating up to zoloft 100 which she should get to next week. Klonopin prn  F/u with pcp in os        CPT Codes 15449-25745 for Established Patients may apply to this [address]. was evaluated through a synchronous (real-time) audio-video encounter. The patient (or guardian if applicable) is aware that this is a billable service, which includes applicable co-pays. This Virtual Visit was conducted with patient's (and/or leg guardian's) consent.  The visit was conducted pursuant to the emergency declaration under the [address] [facility] [place], [place] waiver authority and the [employer] and [employer] Appropriations Act. Patient identification was verified, and a caregiver was present when appropriate. The patient was located in a state where the provider was licensed to provide care. I was at home while conducting this encounter. Pt was at home. We discussed the expected course, resolution and complications of the diagnosis(es) in detail. Medication risks, benefits, costs, interactions, and alternatives were discussed as indicated. I advised her to contact the office if her condition worsens, changes or fails to improve as anticipated. She expressed understanding with the diagnosis(es) and plan.      Elio Dsouza, DO

## 2023-04-11 PROBLEM — N94.10 DYSPAREUNIA IN FEMALE: Status: ACTIVE | Noted: 2023-04-11

## 2023-04-26 ENCOUNTER — OFFICE VISIT (OUTPATIENT)
Dept: OBGYN CLINIC | Age: 28
End: 2023-04-26
Payer: MEDICAID

## 2023-04-26 VITALS
SYSTOLIC BLOOD PRESSURE: 102 MMHG | BODY MASS INDEX: 23.16 KG/M2 | HEIGHT: 65 IN | DIASTOLIC BLOOD PRESSURE: 64 MMHG | WEIGHT: 139 LBS

## 2023-04-26 DIAGNOSIS — N92.0 MENORRHAGIA WITH REGULAR CYCLE: ICD-10-CM

## 2023-04-26 DIAGNOSIS — R10.2 PELVIC PAIN: Primary | ICD-10-CM

## 2023-04-26 DIAGNOSIS — N94.10 DYSPAREUNIA IN FEMALE: ICD-10-CM

## 2023-04-26 DIAGNOSIS — R11.0 CHRONIC NAUSEA: ICD-10-CM

## 2023-04-26 DIAGNOSIS — N94.6 DYSMENORRHEA: ICD-10-CM

## 2023-04-26 PROCEDURE — 76830 TRANSVAGINAL US NON-OB: CPT | Performed by: NURSE PRACTITIONER

## 2023-04-26 PROCEDURE — 99214 OFFICE O/P EST MOD 30 MIN: CPT | Performed by: NURSE PRACTITIONER

## 2023-04-26 RX ORDER — ONDANSETRON 4 MG/1
4 TABLET, FILM COATED ORAL EVERY 8 HOURS PRN
Qty: 30 TABLET | Refills: 1 | Status: SHIPPED | OUTPATIENT
Start: 2023-04-26

## 2023-04-26 RX ORDER — NORETHINDRONE ACETATE AND ETHINYL ESTRADIOL 1.5-30(21)
1 KIT ORAL DAILY
Qty: 1 PACKET | Refills: 12 | Status: SHIPPED | OUTPATIENT
Start: 2023-04-26

## 2023-04-26 RX ORDER — METRONIDAZOLE 500 MG/1
500 TABLET ORAL 2 TIMES DAILY
Qty: 14 TABLET | Refills: 0 | Status: SHIPPED | OUTPATIENT
Start: 2023-04-26 | End: 2023-05-03

## 2023-04-26 RX ORDER — FLUCONAZOLE 150 MG/1
150 TABLET ORAL
Qty: 2 TABLET | Refills: 0 | Status: SHIPPED | OUTPATIENT
Start: 2023-04-26 | End: 2023-04-30

## 2023-04-26 RX ORDER — DOXYCYCLINE HYCLATE 100 MG/1
100 CAPSULE ORAL 2 TIMES DAILY
Qty: 14 CAPSULE | Refills: 0 | Status: SHIPPED | OUTPATIENT
Start: 2023-04-26 | End: 2023-05-03

## 2023-04-26 ASSESSMENT — PATIENT HEALTH QUESTIONNAIRE - PHQ9
SUM OF ALL RESPONSES TO PHQ QUESTIONS 1-9: 0
1. LITTLE INTEREST OR PLEASURE IN DOING THINGS: 0
2. FEELING DOWN, DEPRESSED OR HOPELESS: 0
SUM OF ALL RESPONSES TO PHQ QUESTIONS 1-9: 0
SUM OF ALL RESPONSES TO PHQ9 QUESTIONS 1 & 2: 0
SUM OF ALL RESPONSES TO PHQ QUESTIONS 1-9: 0
SUM OF ALL RESPONSES TO PHQ QUESTIONS 1-9: 0

## 2023-04-26 NOTE — ASSESSMENT & PLAN NOTE
4/11/2023: UPT neg, UA unremarkable  nuswab collected  Symptoms c/w endometriosis (heavy/painful menses, pain with sex)     We discuss today endometriosis etiology, pathophysiology, symptoms and treatment options  Multiple medical and surgical treatment options discussed including expectant management, OTC NSAIDS, OCP's, Nuvaring, Patch, Nexplanon, Depo, IUD, Lupron, Orilissa, dx laparoscopy and hysterectomy. Risks, benefits, SE's reviewed including but not limited to H/A, N/V, thromboembolic events, bleeding patterns, weight gain, efficacy, menopausal symptoms and risks of surgery.      Pt desires birth control patch for contraception. rx sent  F/u for US in 2 weeks and annual in 3 months    4/26/23: US today c/w hydrosalpinx. Std screening was negative, hx chlamydia  Will presumptively tx with Doxy/Flagyl despite active infection. Diflucan rx sent . We discuss s/s of PID/rupture when immediate care is needed    D/C birth control patch d/t nausea, and change to OCPs.  zofran rx sent if needed  Will repeat US with annual that's scheduled in July  If no improvement, we discuss option for GnRH antagonist and dx lap  Pt agreeable
verbalization

## 2023-04-26 NOTE — PROGRESS NOTES
I have reviewed the patient's visit today including history, exam and assessment by RESHMA Blanc. I agree with treatment/plan as above.     Joseph Norris MD  1:13 PM  04/26/23
I have reviewed the patient's visit today including history, exam and assessment by RESHMA Hayden. I agree with treatment/plan as above.     Bina Chen MD  1:14 PM  04/26/23
Pt comes in today for follow up visit with ultrasound. LAST PAP:  07/22/2021 ASCUS, HPV negative     LAST MAMMO:  Never    LMP:  Patient's last menstrual period was 04/17/2023 (exact date).     BIRTH CONTROL:  none    TOBACCO USE:  Yes    FAMILY HISTORY OF:   Breast Cancer:  Yes   Ovarian Cancer:  No   Uterine Cancer:  No   Colon Cancer:  No    Vitals:    04/26/23 1044   BP: 102/64   Site: Left Upper Arm   Position: Sitting   Weight: 139 lb (63 kg)   Height: 5' 5\" (1.651 m)        Farhana Blanco MA  04/26/23  10:51 AM
MCG/24HR Place 1 patch onto the skin once a week (Patient not taking: Reported on 4/26/2023) 3 patch 12    [DISCONTINUED] ondansetron (ZOFRAN) 4 MG tablet Take 1 tablet by mouth every 8 hours as needed for Vomiting (Patient not taking: Reported on 4/26/2023) 90 tablet 1     No facility-administered encounter medications on file as of 4/26/2023.                ASHOK Calloway CNP 04/26/23 11:59 AM

## 2023-09-26 ENCOUNTER — OFFICE VISIT (OUTPATIENT)
Dept: OBGYN CLINIC | Age: 28
End: 2023-09-26
Payer: MEDICAID

## 2023-09-26 VITALS
DIASTOLIC BLOOD PRESSURE: 64 MMHG | WEIGHT: 151 LBS | SYSTOLIC BLOOD PRESSURE: 110 MMHG | HEIGHT: 65 IN | BODY MASS INDEX: 25.16 KG/M2

## 2023-09-26 DIAGNOSIS — Z86.19 HISTORY OF CHLAMYDIA: ICD-10-CM

## 2023-09-26 DIAGNOSIS — R35.0 URINARY FREQUENCY: ICD-10-CM

## 2023-09-26 DIAGNOSIS — N92.0 MENORRHAGIA WITH REGULAR CYCLE: ICD-10-CM

## 2023-09-26 DIAGNOSIS — Z11.3 SCREEN FOR STD (SEXUALLY TRANSMITTED DISEASE): ICD-10-CM

## 2023-09-26 DIAGNOSIS — Z01.419 WOMEN'S ANNUAL ROUTINE GYNECOLOGICAL EXAMINATION: ICD-10-CM

## 2023-09-26 DIAGNOSIS — Z11.3 SCREEN FOR STD (SEXUALLY TRANSMITTED DISEASE): Primary | ICD-10-CM

## 2023-09-26 DIAGNOSIS — R35.0 URINATION FREQUENCY: ICD-10-CM

## 2023-09-26 LAB
ALBUMIN SERPL-MCNC: 3.9 G/DL (ref 3.5–5)
ALBUMIN/GLOB SERPL: 1.3 (ref 0.4–1.6)
ALP SERPL-CCNC: 71 U/L (ref 50–136)
ALT SERPL-CCNC: 25 U/L (ref 12–65)
ANION GAP SERPL CALC-SCNC: 4 MMOL/L (ref 2–11)
AST SERPL-CCNC: 14 U/L (ref 15–37)
BILIRUB SERPL-MCNC: 0.3 MG/DL (ref 0.2–1.1)
BILIRUBIN, URINE, POC: NEGATIVE
BLOOD URINE, POC: NEGATIVE
BUN SERPL-MCNC: 11 MG/DL (ref 6–23)
CALCIUM SERPL-MCNC: 8.9 MG/DL (ref 8.3–10.4)
CHLORIDE SERPL-SCNC: 113 MMOL/L (ref 101–110)
CO2 SERPL-SCNC: 28 MMOL/L (ref 21–32)
CREAT SERPL-MCNC: 0.7 MG/DL (ref 0.6–1)
ERYTHROCYTE [DISTWIDTH] IN BLOOD BY AUTOMATED COUNT: 12.7 % (ref 11.9–14.6)
GLOBULIN SER CALC-MCNC: 2.9 G/DL (ref 2.8–4.5)
GLUCOSE SERPL-MCNC: 88 MG/DL (ref 65–100)
GLUCOSE URINE, POC: NEGATIVE
HBV SURFACE AG SER QL: NONREACTIVE
HCT VFR BLD AUTO: 40 % (ref 35.8–46.3)
HCV AB SER QL: NONREACTIVE
HGB BLD-MCNC: 13.5 G/DL (ref 11.7–15.4)
HIV 1+2 AB+HIV1 P24 AG SERPL QL IA: NONREACTIVE
HIV 1/2 RESULT COMMENT: NORMAL
KETONES, URINE, POC: NEGATIVE
LEUKOCYTE ESTERASE, URINE, POC: NEGATIVE
MCH RBC QN AUTO: 29.9 PG (ref 26.1–32.9)
MCHC RBC AUTO-ENTMCNC: 33.8 G/DL (ref 31.4–35)
MCV RBC AUTO: 88.5 FL (ref 82–102)
NITRITE, URINE, POC: ABNORMAL
NRBC # BLD: 0 K/UL (ref 0–0.2)
PH, URINE, POC: 8.5 (ref 4.6–8)
PLATELET # BLD AUTO: 298 K/UL (ref 150–450)
PMV BLD AUTO: 9.5 FL (ref 9.4–12.3)
POTASSIUM SERPL-SCNC: 4.2 MMOL/L (ref 3.5–5.1)
PROT SERPL-MCNC: 6.8 G/DL (ref 6.3–8.2)
PROTEIN,URINE, POC: NEGATIVE
RBC # BLD AUTO: 4.52 M/UL (ref 4.05–5.2)
SODIUM SERPL-SCNC: 145 MMOL/L (ref 133–143)
SPECIFIC GRAVITY, URINE, POC: 1.02 (ref 1–1.03)
URINALYSIS CLARITY, POC: CLEAR
URINALYSIS COLOR, POC: YELLOW
UROBILINOGEN, POC: ABNORMAL
WBC # BLD AUTO: 4.9 K/UL (ref 4.3–11.1)

## 2023-09-26 PROCEDURE — 99213 OFFICE O/P EST LOW 20 MIN: CPT | Performed by: NURSE PRACTITIONER

## 2023-09-26 PROCEDURE — 81001 URINALYSIS AUTO W/SCOPE: CPT | Performed by: NURSE PRACTITIONER

## 2023-09-26 PROCEDURE — 99395 PREV VISIT EST AGE 18-39: CPT | Performed by: NURSE PRACTITIONER

## 2023-09-26 RX ORDER — ETONOGESTREL AND ETHINYL ESTRADIOL 11.7; 2.7 MG/1; MG/1
1 INSERT, EXTENDED RELEASE VAGINAL
Qty: 3 EACH | Refills: 3 | Status: SHIPPED | OUTPATIENT
Start: 2023-09-26

## 2023-09-26 RX ORDER — BUSPIRONE HYDROCHLORIDE 5 MG/1
TABLET ORAL
COMMUNITY
Start: 2023-06-19

## 2023-09-26 RX ORDER — CETIRIZINE HYDROCHLORIDE 10 MG/1
TABLET ORAL
COMMUNITY
Start: 2023-06-19 | End: 2023-09-26

## 2023-09-26 NOTE — ASSESSMENT & PLAN NOTE
Pap + std screening today  mammo n/a  HPV vaccine not received, VIS reviewed, pt to review and let us know at next visit if she would like to proceed

## 2023-09-26 NOTE — PROGRESS NOTES
Patient here for AE and f/u on endometriosis therapies. LAST PAP:  7/22/2021, ASC-US, HPV neg., chlamydia pos. LAST MAMMO:  never     LMP:  Patient's last menstrual period was 09/19/2023. Sexually Active: yes    BIRTH CONTROL:  none-patient has not taken it since 05/2023, patient could not remember to take it everyday. TOBACCO USE:  Yes-some days, trying to cut back.      FAMILY HISTORY OF:   Breast Cancer:  No   Ovarian Cancer:  No   Uterine Cancer:  No   Colon Cancer:  No    Vitals:    09/26/23 0900   BP: 110/64   Site: Left Upper Arm   Position: Sitting   Weight: 151 lb (68.5 kg)   Height: 5' 5\" (1.651 m)        Padma Yanes RN  09/26/23  9:13 AM

## 2023-09-26 NOTE — ASSESSMENT & PLAN NOTE
Throughout the day and night  Had 1 episode of incontinence when bending over  Exam today grade 2 cystocele noted  Plan pelvic floor PT and pelvic US, if normal consider starting anticholinergic agent or myrbetriq, if no improvement plan referral to urology

## 2023-09-26 NOTE — PROGRESS NOTES
Eb Powell is a 32 y.o. [id number] who is here for annual exam and has several concerns. Pt seen 2023 with concern for painful periods, heavy periods, and pain with intercourse. Pt was being treated with birth control patch for suspected endometriosis but had to stop due to nausea. We then switched to OCPs but pt stopped as she could not remember to take. Previously had IUD but it removed due to side effects. Ultrasound in 2023 showed hydrosalpinx, pt was supposed to return in July for repeat US but did not come then. Menses Q month, lasting 4 days. Very painful. Denies pain with defecation. , no hx of infertility. Hx of chlamydia  Pt also c/o urinary frequency. Started 2 months ago. Denies dysuria or hematuria. Waking >8 times at night to urinate. Patient's last menstrual period was 2023. Birth Control:none    Last Pap:2021, ASC-US, HPV neg., chlamydia pos. Hx abnormal pap or STD:chlamydia    Hx of receiving HPV vaccination:no    Last Mammo: n/a    Fam Hx of Breast, ovarian or uterine cancer:denies    Sexually Active:yes          ROS:    Breast: Denies pain, lump or nipple discharge    GYN: Denies discharge, itching, odor or dysuria. Constitutional: Negative for chills and fever. HENT: Negative for severe headaches or vision changes    Respiratory: Negative for cough and shortness of breath. Cardiovascular: Negative for chest pain and palpitations. Gastrointestinal: Negative for nausea and vomiting. Negative for diarrhea and constipation    Genitourinary: Negative for dysuria and hematuria.      Musculoskeletal: Negative for back pain          Past Medical History:   Diagnosis Date    Anxiety     buspar    Chlamydia     Depression     no meds    History of chicken pox        Past Surgical History:   Procedure Laterality Date    UPPER GASTROINTESTINAL ENDOSCOPY  2023       Family History   Problem Relation Age of Onset    Ovarian Cancer Neg

## 2023-09-27 LAB
EST. AVERAGE GLUCOSE BLD GHB EST-MCNC: 91 MG/DL
HBA1C MFR BLD: 4.8 % (ref 4.8–5.6)
RPR SER QL: NONREACTIVE

## 2023-09-28 LAB
BACTERIA SPEC CULT: ABNORMAL
SERVICE CMNT-IMP: ABNORMAL

## 2023-09-29 ENCOUNTER — TELEPHONE (OUTPATIENT)
Dept: OBGYN CLINIC | Age: 28
End: 2023-09-29

## 2023-09-29 NOTE — TELEPHONE ENCOUNTER
Called pt, message below reviewed with pt, pt voiced understanding and is not having UTI symptoms so recollection will not be scheduled.

## 2023-10-04 LAB
C TRACH RRNA CVX QL NAA+PROBE: NEGATIVE
CYTOLOGIST CVX/VAG CYTO: NORMAL
CYTOLOGY CVX/VAG DOC THIN PREP: NORMAL
HPV REFLEX: NORMAL
Lab: NORMAL
Lab: NORMAL
N GONORRHOEA RRNA CVX QL NAA+PROBE: NEGATIVE
PATH REPORT.FINAL DX SPEC: NORMAL
STAT OF ADQ CVX/VAG CYTO-IMP: NORMAL
T VAGINALIS RRNA SPEC QL NAA+PROBE: NEGATIVE

## 2023-10-26 PROBLEM — Z01.419 WOMEN'S ANNUAL ROUTINE GYNECOLOGICAL EXAMINATION: Status: RESOLVED | Noted: 2023-09-26 | Resolved: 2023-10-26

## 2024-01-02 ENCOUNTER — TELEPHONE (OUTPATIENT)
Dept: OBGYN CLINIC | Age: 29
End: 2024-01-02

## 2024-02-14 ENCOUNTER — OFFICE VISIT (OUTPATIENT)
Dept: OBGYN CLINIC | Age: 29
End: 2024-02-14
Payer: MEDICAID

## 2024-02-14 VITALS
BODY MASS INDEX: 23.99 KG/M2 | WEIGHT: 144 LBS | DIASTOLIC BLOOD PRESSURE: 64 MMHG | SYSTOLIC BLOOD PRESSURE: 112 MMHG | HEIGHT: 65 IN

## 2024-02-14 DIAGNOSIS — R10.2 PELVIC PAIN: Primary | ICD-10-CM

## 2024-02-14 DIAGNOSIS — Z11.3 SCREEN FOR STD (SEXUALLY TRANSMITTED DISEASE): ICD-10-CM

## 2024-02-14 DIAGNOSIS — R35.0 URINATION FREQUENCY: ICD-10-CM

## 2024-02-14 DIAGNOSIS — N92.0 MENORRHAGIA WITH REGULAR CYCLE: ICD-10-CM

## 2024-02-14 LAB
BILIRUBIN, URINE, POC: NEGATIVE
BLOOD URINE, POC: NEGATIVE
GLUCOSE URINE, POC: NEGATIVE
HCG, PREGNANCY, URINE, POC: NEGATIVE
KETONES, URINE, POC: NEGATIVE
LEUKOCYTE ESTERASE, URINE, POC: NEGATIVE
NITRITE, URINE, POC: NEGATIVE
PH, URINE, POC: 7 (ref 4.6–8)
PROTEIN,URINE, POC: NEGATIVE
SPECIFIC GRAVITY, URINE, POC: 1.01 (ref 1–1.03)
URINALYSIS CLARITY, POC: CLEAR
URINALYSIS COLOR, POC: YELLOW
UROBILINOGEN, POC: NORMAL
VALID INTERNAL CONTROL, POC: YES

## 2024-02-14 PROCEDURE — 81002 URINALYSIS NONAUTO W/O SCOPE: CPT | Performed by: NURSE PRACTITIONER

## 2024-02-14 PROCEDURE — 99213 OFFICE O/P EST LOW 20 MIN: CPT | Performed by: NURSE PRACTITIONER

## 2024-02-14 PROCEDURE — 99459 PELVIC EXAMINATION: CPT | Performed by: NURSE PRACTITIONER

## 2024-02-14 PROCEDURE — 81025 URINE PREGNANCY TEST: CPT | Performed by: NURSE PRACTITIONER

## 2024-02-14 RX ORDER — FAMOTIDINE 20 MG/1
20 TABLET, FILM COATED ORAL 2 TIMES DAILY PRN
COMMUNITY

## 2024-02-14 NOTE — PROGRESS NOTES
Patient here for concern of left sided pain that \"comes and goes\" that started approx 3 weeks ago. Patient denies any itching, burning, odor. Patient states that when she feels the pain she rates it 6/10.     Patient states that she was having car issues and that is why she was unable to make it to her US appointment and has temporarily put PT on hold.   Patient plans to restart PT when her car is fixed this weekend.     Patient denies having previously received Gardasil series-stated to patient that our office does not have Gardasil at this time but she can call our office over the next several weeks to see when she can schedule her first injection or check with her insurance to see if she get her Gardasil elsewhere.     LAST PAP:  9/26/2023, neg.    LAST MAMMO:  never     LMP:  Patient's last menstrual period was 02/01/2024 (exact date).    BIRTH CONTROL:  none    TOBACCO USE:  Yes-still trying to quit     FAMILY HISTORY OF:   Breast Cancer:  No   Ovarian Cancer:  No   Uterine Cancer:  No   Colon Cancer:  No    Vitals:    02/14/24 0911   BP: 112/64   Site: Left Upper Arm   Position: Sitting   Weight: 65.3 kg (144 lb)   Height: 1.651 m (5' 5\")      Chaperone for Intimate Exam     Chaperone was offer accepted as part of the rooming process    Chaperone: Susannah CHAVES RN  02/14/24  9:25 AM

## 2024-02-14 NOTE — PROGRESS NOTES
Nadine Kaba is a 28 y.o.  who is here for followup    Last seen 2023 for AE with the following concerns  \"Nadine Kaba is a 27 y.o.  who is here for annual exam and has several concerns.   Pt seen 2023 with concern for painful periods, heavy periods, and pain with intercourse. Pt was being treated with birth control patch for suspected endometriosis but had to stop due to nausea. We then switched to OCPs but pt stopped as she could not remember to take. Previously had IUD but it removed due to side effects. Ultrasound in 2023 showed hydrosalpinx, pt was supposed to return in July for repeat US but did not come then. Menses Q month, lasting 4 days. Very painful. Denies pain with defecation. , no hx of infertility. Hx of chlamydia  Pt also c/o urinary frequency. Started 2 months ago. Denies dysuria or hematuria. Waking >8 times at night to urinate.\"      2024: pt did not make it to US appt, due to transportation issues. She did start pelvic floor PT for urinary concerns but had to stop. States symptoms have improved.  No change to period symptoms. For the last 3 weeks has had left sided pain that radiates to central pelvic area. Worse when moving around. Tried midol with little relief.   Pt was supposed to start nuvaring after last visit but did not. Plans to start after next menses  Denies UTI symptoms, diarrhea, constipation, or vaginal discharge, itching or odor.           Patient's last menstrual period was 2024 (exact date).              Past Medical History:   Diagnosis Date    Anxiety     buspar    Chlamydia     Depression     no meds    History of chicken pox        Past Surgical History:   Procedure Laterality Date    UPPER GASTROINTESTINAL ENDOSCOPY  2023       Family History   Problem Relation Age of Onset    Ovarian Cancer Neg Hx     Uterine Cancer Neg Hx     No Known Problems Mother     Diabetes Father     No Known Problems

## 2024-02-14 NOTE — ASSESSMENT & PLAN NOTE
9/26/2023: Throughout the day and night  Had 1 episode of incontinence when bending over  Exam today grade 2 cystocele noted  Plan pelvic floor PT and pelvic US, if normal consider starting anticholinergic agent or myrbetriq, if no improvement plan referral to urology    2/14/2024: pt had to stop PT due to car issues but states it helped a lot, symptoms have improved

## 2024-02-14 NOTE — ASSESSMENT & PLAN NOTE
4/11/2023: UPT neg, UA unremarkable  nuswab collected  Symptoms c/w endometriosis (heavy/painful menses, pain with sex)     We discuss today endometriosis etiology, pathophysiology, symptoms and treatment options  Multiple medical and surgical treatment options discussed including expectant management, OTC NSAIDS, OCP's, Nuvaring, Patch, Nexplanon, Depo, IUD, Lupron, Orilissa, dx laparoscopy and hysterectomy. Risks, benefits, SE's reviewed including but not limited to H/A, N/V, thromboembolic events, bleeding patterns, weight gain, efficacy, menopausal symptoms and risks of surgery.      Pt desires birth control patch for contraception. rx sent  F/u for US in 2 weeks and annual in 3 months     4/26/23: US today c/w hydrosalpinx. Std screening was negative, hx chlamydia  Will presumptively tx with Doxy/Flagyl despite active infection. Diflucan rx sent . We discuss s/s of PID/rupture when immediate care is needed     D/C birth control patch d/t nausea, and change to OCPs. zofran rx sent if needed  Will repeat US with annual that's scheduled in July  If no improvement, we discuss option for GnRH antagonist and dx lap  Pt agreeable      9/26/23: pt stopped OCPs due to she could not remember to take  Pt missed repeat U scheduled in July, rescheduled today  We again discuss tx for presumptive endometriosis, medication and surgical option for dx lap  Pt interested in GnRH antagonist as well as dx lap  Will f/u for US  rx sent for myfembree. We discuss need for contraception and pt desires nuvaring (she does not want to try an implant or OCPs)    2/14/2024: pt no showed to US appt to recheck hydrosalpinx  Did not start myfembree or nuvaring yet  Plans to start nuvaring with next menses, does not with to proceed with gnrh anatgonist at this time  Interested in surgical consultation to discuss dx lap  Appt scheduled  UA/UPT neg, GC collected

## 2024-02-19 LAB
C TRACH RRNA SPEC QL NAA+PROBE: NEGATIVE
N GONORRHOEA RRNA SPEC QL NAA+PROBE: NEGATIVE
SPECIMEN SOURCE: NORMAL
T VAGINALIS RRNA SPEC QL NAA+PROBE: NEGATIVE

## 2024-02-29 ENCOUNTER — PROCEDURE VISIT (OUTPATIENT)
Dept: OBGYN CLINIC | Age: 29
End: 2024-02-29
Payer: MEDICAID

## 2024-02-29 ENCOUNTER — OFFICE VISIT (OUTPATIENT)
Dept: OBGYN CLINIC | Age: 29
End: 2024-02-29
Payer: MEDICAID

## 2024-02-29 VITALS
DIASTOLIC BLOOD PRESSURE: 66 MMHG | HEIGHT: 65 IN | SYSTOLIC BLOOD PRESSURE: 108 MMHG | WEIGHT: 168 LBS | BODY MASS INDEX: 27.99 KG/M2

## 2024-02-29 DIAGNOSIS — F17.210 CIGARETTE SMOKER: ICD-10-CM

## 2024-02-29 DIAGNOSIS — R10.2 PELVIC PAIN: Primary | ICD-10-CM

## 2024-02-29 DIAGNOSIS — N94.6 DYSMENORRHEA: ICD-10-CM

## 2024-02-29 DIAGNOSIS — N92.0 MENORRHAGIA WITH REGULAR CYCLE: ICD-10-CM

## 2024-02-29 DIAGNOSIS — N94.10 DYSPAREUNIA IN FEMALE: ICD-10-CM

## 2024-02-29 PROBLEM — R35.0 URINATION FREQUENCY: Status: RESOLVED | Noted: 2023-09-26 | Resolved: 2024-02-29

## 2024-02-29 PROCEDURE — 76830 TRANSVAGINAL US NON-OB: CPT | Performed by: OBSTETRICS & GYNECOLOGY

## 2024-02-29 PROCEDURE — 99406 BEHAV CHNG SMOKING 3-10 MIN: CPT | Performed by: OBSTETRICS & GYNECOLOGY

## 2024-02-29 PROCEDURE — 99214 OFFICE O/P EST MOD 30 MIN: CPT | Performed by: OBSTETRICS & GYNECOLOGY

## 2024-02-29 RX ORDER — FAMOTIDINE 40 MG/1
TABLET, FILM COATED ORAL
COMMUNITY
Start: 2024-02-26

## 2024-02-29 NOTE — ASSESSMENT & PLAN NOTE
US ordered today, reviewed and D/W pt in detail  Pt with Fhx of endometriosis.  She has failed outpt medical mgmt with OCPs and desires more definitive evaluation/treatment    PLAN:  Dx lap, any other indicated procedures  D/W pt at length risks/benefits of procedure including but not limited to death, bleeding, infection, possible treatment failure and damage to other internal organs. She exhibited full understanding and wishes to proceed.

## 2024-02-29 NOTE — ASSESSMENT & PLAN NOTE
See A&P under pelvic pain  Encouraged pt to start taking ibuprofen about 1-2 days before menses begins to decrease bleeding and cramping by potentially 50%.

## 2024-03-01 ENCOUNTER — TELEPHONE (OUTPATIENT)
Dept: OBGYN CLINIC | Age: 29
End: 2024-03-01

## 2024-03-01 DIAGNOSIS — R10.2 PELVIC PAIN: Primary | ICD-10-CM

## 2024-03-01 DIAGNOSIS — N94.6 DYSMENORRHEA: ICD-10-CM

## 2024-03-01 DIAGNOSIS — N94.10 DYSPAREUNIA IN FEMALE: ICD-10-CM

## 2024-03-01 DIAGNOSIS — N92.0 MENORRHAGIA WITH REGULAR CYCLE: ICD-10-CM

## 2024-03-01 NOTE — TELEPHONE ENCOUNTER
Called patient to discuss surgery dates. Advised she call office to discuss further. Lm on voicemail

## 2024-03-01 NOTE — PROGRESS NOTES
[facility]  [address] [facility], [place] [address]  [phone]    Rafael Child MD, FACOG  Yolanda Grijalva [facility]  Parul Holloway MD, FACOG    Assessment/Plan     Patient Active Problem List    Diagnosis Date Noted    Pelvic pain 02/29/2024     Overview Note:     Pt with Fhx of endometriosis.  She has failed outpt medical mgmt with OCPs and desires more definitive evaluation/treatment    PLAN:  Dx lap, any other indicated procedures  D/W pt at length risks/benefits of procedure including but not limited to death, bleeding, infection, possible treatment failure and damage to other internal organs. She exhibited full understanding and wishes to proceed.         Assessment & Plan Note:     US ordered today, reviewed and D/W pt in detail  Pt with Fhx of endometriosis.  She has failed outpt medical mgmt with OCPs and desires more definitive evaluation/treatment    PLAN:  Dx lap, any other indicated procedures  D/W pt at length risks/benefits of procedure including but not limited to death, bleeding, infection, possible treatment failure and damage to other internal organs. She exhibited full understanding and wishes to proceed.        Cigarette smoker 02/29/2024    Menorrhagia with regular cycle 04/26/2023     Overview Note:     noted       Assessment & Plan Note:     See A&P under pelvic pain      Dysmenorrhea 04/26/2023     Overview Note:     noted       Assessment & Plan Note:     See A&P under pelvic pain  Encouraged pt to start taking ibuprofen about 1-2 days before menses begins to decrease bleeding and cramping by potentially 50%.       Dyspareunia in female 04/11/2023     Overview Note:     noted       Assessment & Plan Note:     See A&P under pelvic pain      Epidermal cyst 07/22/2021    Chronic nausea 07/07/2021    GERD (gastroesophageal reflux disease) 04/07/2020    TOS (thoracic outlet syndrome) 07/17/2019    Chronic abdominal pain 05/09/2019    Chronic diarrhea 05/09/2019    Insomnia

## 2024-03-01 NOTE — TELEPHONE ENCOUNTER
Name: Nadine Kaba     : 1995    Physicians Information    Recommended Surgery: Dx lap, any other indicated procedures  CPT Code:   37667  Place:[facility]  When:  Diagnosis:     ICD-10-CM    1. Pelvic pain  R10.2       2. Menorrhagia with regular cycle  N92.0       3. Dyspareunia in female  N94.10       4. Dysmenorrhea  N94.6          Time Needed:  Surgeon:Rafael Child MD  Assistant Needed:  Special Request:    Surgery Department Information    Date Scheduled: _____________________ Hospital Pre-Op:______________________        Time Scheduled : ____________________ Surgery Entered: _____________________    Facility: ____________________________ Patient Notified: ______________________    BSO Pre-Op: _______________________ Orders Completed: ___________________

## 2024-03-07 ENCOUNTER — PREP FOR PROCEDURE (OUTPATIENT)
Dept: OBGYN CLINIC | Age: 29
End: 2024-03-07

## 2024-04-01 NOTE — H&P
file   Intimate Partner Violence: Not on file   Housing Stability: Unknown (2/14/2024)    Housing Stability Vital Sign     Unable to Pay for Housing in the Last Year: Not on file     Number of Places Lived in the Last Year: Not on file     Unstable Housing in the Last Year: No       Allergies    Allergies   Allergen Reactions    Metoprolol Other (See Comments)     Shaky, unable to walk         Review of Systems    Constitutional: No fevers or chills     CV: No chest pain or palpatations    Resp: No SOB or cough    GI: No nausea/vomiting/diarrhea/constipation    Neuro: No HA, no seizure like activity    Skin: No rashes or lesions     : No dysuria or hematuria        Objective    There were no vitals filed for this visit.       Physical Exam    Gen: alert and cooperative, NAD    HEENT: NCAT    CV: RRR    Resp: CTA bilat    Abd: soft, NT, NABS    EXT: trace edema bilat    Gyn: done as per prev office visit    Neuro: No focal deficits    Skin: No noted rashes or lesions       Rafael Child MD  11:22 AM  04/01/24

## 2024-04-02 ENCOUNTER — ANESTHESIA EVENT (OUTPATIENT)
Dept: SURGERY | Age: 29
End: 2024-04-02
Payer: MEDICAID

## 2024-04-02 NOTE — PERIOP NOTE
Patient verified name and .  Order for consent found in EHR and matches case posting; patient verifies procedure.   Type 2 surgery, PAT phone assessment complete.  Orders received.  Labs per surgeon: urine pregnancy DOS  Labs per anesthesia protocol: Hgb DOS    Patient answered medical/surgical history questions at their best of ability. All prior to admission medications documented in EPIC.    Patient instructed to continue taking all prescription medications up to the day of surgery but to take only the following medications the day of surgery according to anesthesia guidelines with a small sip of water: none. Also, patient is requested to take 2 Tylenol in the morning and then again before bed on the day before surgery. Regular or extra strength may be used.       Patient informed that all vitamins and supplements should be held 7 days prior to surgery and NSAIDS 5 days prior to surgery.    Patient instructed on the following:    > Arrive at A Entrance, time of arrival to be called the day before by 1700  > NPO after midnight, unless otherwise indicated, including gum, mints, and ice chips  > Responsible adult must drive patient to the hospital, stay during surgery, and patient will need supervision 24 hours after anesthesia  > Use non moisturizing soap in shower the night before surgery and on the morning of surgery  > All piercings must be removed prior to arrival.    > Leave all valuables (money and jewelry) at home but bring insurance card and ID on DOS.   > Do not wear make-up, nail polish, lotions, cologne, perfumes, powders, or oil on skin. Artificial nails are not permitted.

## 2024-04-03 ENCOUNTER — HOSPITAL ENCOUNTER (OUTPATIENT)
Age: 29
Setting detail: OUTPATIENT SURGERY
Discharge: HOME | End: 2024-04-03
Attending: OBSTETRICS & GYNECOLOGY | Admitting: OBSTETRICS & GYNECOLOGY
Payer: MEDICAID

## 2024-04-03 ENCOUNTER — ANESTHESIA (OUTPATIENT)
Dept: SURGERY | Age: 29
End: 2024-04-03
Payer: MEDICAID

## 2024-04-03 VITALS
OXYGEN SATURATION: 100 % | HEART RATE: 50 BPM | DIASTOLIC BLOOD PRESSURE: 67 MMHG | SYSTOLIC BLOOD PRESSURE: 110 MMHG | TEMPERATURE: 97.4 F | WEIGHT: 169.1 LBS | RESPIRATION RATE: 16 BRPM | HEIGHT: 65 IN | BODY MASS INDEX: 28.17 KG/M2

## 2024-04-03 DIAGNOSIS — N73.6 PELVIC ADHESIVE DISEASE: Primary | ICD-10-CM

## 2024-04-03 DIAGNOSIS — N94.10 DYSPAREUNIA IN FEMALE: ICD-10-CM

## 2024-04-03 DIAGNOSIS — N94.6 DYSMENORRHEA: ICD-10-CM

## 2024-04-03 DIAGNOSIS — R10.2 PELVIC PAIN: ICD-10-CM

## 2024-04-03 LAB
HCG UR QL: NEGATIVE
HGB BLD-MCNC: 13 G/DL (ref 11.7–15.4)

## 2024-04-03 PROCEDURE — 7100000001 HC PACU RECOVERY - ADDTL 15 MIN: Performed by: OBSTETRICS & GYNECOLOGY

## 2024-04-03 PROCEDURE — 6360000002 HC RX W HCPCS: Performed by: NURSE ANESTHETIST, CERTIFIED REGISTERED

## 2024-04-03 PROCEDURE — 2500000003 HC RX 250 WO HCPCS: Performed by: NURSE ANESTHETIST, CERTIFIED REGISTERED

## 2024-04-03 PROCEDURE — 7100000010 HC PHASE II RECOVERY - FIRST 15 MIN: Performed by: OBSTETRICS & GYNECOLOGY

## 2024-04-03 PROCEDURE — 7100000011 HC PHASE II RECOVERY - ADDTL 15 MIN: Performed by: OBSTETRICS & GYNECOLOGY

## 2024-04-03 PROCEDURE — 3600000014 HC SURGERY LEVEL 4 ADDTL 15MIN: Performed by: OBSTETRICS & GYNECOLOGY

## 2024-04-03 PROCEDURE — 3600000004 HC SURGERY LEVEL 4 BASE: Performed by: OBSTETRICS & GYNECOLOGY

## 2024-04-03 PROCEDURE — 2580000003 HC RX 258: Performed by: ANESTHESIOLOGY

## 2024-04-03 PROCEDURE — 3700000000 HC ANESTHESIA ATTENDED CARE: Performed by: OBSTETRICS & GYNECOLOGY

## 2024-04-03 PROCEDURE — 6360000002 HC RX W HCPCS: Performed by: ANESTHESIOLOGY

## 2024-04-03 PROCEDURE — 7100000000 HC PACU RECOVERY - FIRST 15 MIN: Performed by: OBSTETRICS & GYNECOLOGY

## 2024-04-03 PROCEDURE — 85018 HEMOGLOBIN: CPT

## 2024-04-03 PROCEDURE — 3700000001 HC ADD 15 MINUTES (ANESTHESIA): Performed by: OBSTETRICS & GYNECOLOGY

## 2024-04-03 PROCEDURE — 2709999900 HC NON-CHARGEABLE SUPPLY: Performed by: OBSTETRICS & GYNECOLOGY

## 2024-04-03 PROCEDURE — 58660 LAPAROSCOPY LYSIS: CPT | Performed by: OBSTETRICS & GYNECOLOGY

## 2024-04-03 PROCEDURE — 6370000000 HC RX 637 (ALT 250 FOR IP): Performed by: ANESTHESIOLOGY

## 2024-04-03 PROCEDURE — 81025 URINE PREGNANCY TEST: CPT

## 2024-04-03 RX ORDER — HYDROMORPHONE HYDROCHLORIDE 1 MG/ML
0.5 INJECTION, SOLUTION INTRAMUSCULAR; INTRAVENOUS; SUBCUTANEOUS EVERY 5 MIN PRN
Status: DISCONTINUED | OUTPATIENT
Start: 2024-04-03 | End: 2024-04-03 | Stop reason: HOSPADM

## 2024-04-03 RX ORDER — LIDOCAINE HYDROCHLORIDE 20 MG/ML
INJECTION, SOLUTION EPIDURAL; INFILTRATION; INTRACAUDAL; PERINEURAL PRN
Status: DISCONTINUED | OUTPATIENT
Start: 2024-04-03 | End: 2024-04-03 | Stop reason: SDUPTHER

## 2024-04-03 RX ORDER — DEXAMETHASONE SODIUM PHOSPHATE 10 MG/ML
INJECTION INTRAMUSCULAR; INTRAVENOUS PRN
Status: DISCONTINUED | OUTPATIENT
Start: 2024-04-03 | End: 2024-04-03 | Stop reason: SDUPTHER

## 2024-04-03 RX ORDER — OXYCODONE HYDROCHLORIDE 5 MG/1
5 TABLET ORAL
Status: DISCONTINUED | OUTPATIENT
Start: 2024-04-03 | End: 2024-04-03 | Stop reason: HOSPADM

## 2024-04-03 RX ORDER — ONDANSETRON 2 MG/ML
INJECTION INTRAMUSCULAR; INTRAVENOUS PRN
Status: DISCONTINUED | OUTPATIENT
Start: 2024-04-03 | End: 2024-04-03 | Stop reason: SDUPTHER

## 2024-04-03 RX ORDER — SODIUM CHLORIDE 0.9 % (FLUSH) 0.9 %
5-40 SYRINGE (ML) INJECTION EVERY 12 HOURS SCHEDULED
Status: DISCONTINUED | OUTPATIENT
Start: 2024-04-03 | End: 2024-04-03 | Stop reason: HOSPADM

## 2024-04-03 RX ORDER — SODIUM CHLORIDE 9 MG/ML
INJECTION, SOLUTION INTRAVENOUS PRN
Status: DISCONTINUED | OUTPATIENT
Start: 2024-04-03 | End: 2024-04-03

## 2024-04-03 RX ORDER — MIDAZOLAM HYDROCHLORIDE 1 MG/ML
INJECTION INTRAMUSCULAR; INTRAVENOUS PRN
Status: DISCONTINUED | OUTPATIENT
Start: 2024-04-03 | End: 2024-04-03 | Stop reason: SDUPTHER

## 2024-04-03 RX ORDER — SODIUM CHLORIDE 0.9 % (FLUSH) 0.9 %
5-40 SYRINGE (ML) INJECTION PRN
Status: DISCONTINUED | OUTPATIENT
Start: 2024-04-03 | End: 2024-04-03 | Stop reason: HOSPADM

## 2024-04-03 RX ORDER — SODIUM CHLORIDE 9 MG/ML
INJECTION, SOLUTION INTRAVENOUS PRN
Status: DISCONTINUED | OUTPATIENT
Start: 2024-04-03 | End: 2024-04-03 | Stop reason: HOSPADM

## 2024-04-03 RX ORDER — ROCURONIUM BROMIDE 10 MG/ML
INJECTION, SOLUTION INTRAVENOUS PRN
Status: DISCONTINUED | OUTPATIENT
Start: 2024-04-03 | End: 2024-04-03 | Stop reason: SDUPTHER

## 2024-04-03 RX ORDER — NEOSTIGMINE METHYLSULFATE 1 MG/ML
INJECTION, SOLUTION INTRAVENOUS PRN
Status: DISCONTINUED | OUTPATIENT
Start: 2024-04-03 | End: 2024-04-03 | Stop reason: SDUPTHER

## 2024-04-03 RX ORDER — GLYCOPYRROLATE 0.2 MG/ML
INJECTION INTRAMUSCULAR; INTRAVENOUS PRN
Status: DISCONTINUED | OUTPATIENT
Start: 2024-04-03 | End: 2024-04-03 | Stop reason: SDUPTHER

## 2024-04-03 RX ORDER — SODIUM CHLORIDE, SODIUM LACTATE, POTASSIUM CHLORIDE, CALCIUM CHLORIDE 600; 310; 30; 20 MG/100ML; MG/100ML; MG/100ML; MG/100ML
INJECTION, SOLUTION INTRAVENOUS CONTINUOUS
Status: DISCONTINUED | OUTPATIENT
Start: 2024-04-03 | End: 2024-04-03 | Stop reason: HOSPADM

## 2024-04-03 RX ORDER — IBUPROFEN 600 MG/1
600 TABLET ORAL EVERY 6 HOURS PRN
Qty: 60 TABLET | Refills: 0 | Status: SHIPPED | OUTPATIENT
Start: 2024-04-03

## 2024-04-03 RX ORDER — HYDROCODONE BITARTRATE AND ACETAMINOPHEN 5; 325 MG/1; MG/1
1 TABLET ORAL EVERY 6 HOURS PRN
Qty: 18 TABLET | Refills: 0 | Status: SHIPPED | OUTPATIENT
Start: 2024-04-03 | End: 2024-04-08

## 2024-04-03 RX ORDER — FENTANYL CITRATE 50 UG/ML
INJECTION, SOLUTION INTRAMUSCULAR; INTRAVENOUS PRN
Status: DISCONTINUED | OUTPATIENT
Start: 2024-04-03 | End: 2024-04-03 | Stop reason: SDUPTHER

## 2024-04-03 RX ORDER — PROPOFOL 10 MG/ML
INJECTION, EMULSION INTRAVENOUS PRN
Status: DISCONTINUED | OUTPATIENT
Start: 2024-04-03 | End: 2024-04-03 | Stop reason: SDUPTHER

## 2024-04-03 RX ORDER — ACETAMINOPHEN 500 MG
1000 TABLET ORAL ONCE
Status: COMPLETED | OUTPATIENT
Start: 2024-04-03 | End: 2024-04-03

## 2024-04-03 RX ORDER — DIPHENHYDRAMINE HYDROCHLORIDE 50 MG/ML
12.5 INJECTION INTRAMUSCULAR; INTRAVENOUS
Status: DISCONTINUED | OUTPATIENT
Start: 2024-04-03 | End: 2024-04-03 | Stop reason: HOSPADM

## 2024-04-03 RX ORDER — NALOXONE HYDROCHLORIDE 0.4 MG/ML
INJECTION, SOLUTION INTRAMUSCULAR; INTRAVENOUS; SUBCUTANEOUS PRN
Status: DISCONTINUED | OUTPATIENT
Start: 2024-04-03 | End: 2024-04-03 | Stop reason: HOSPADM

## 2024-04-03 RX ORDER — ONDANSETRON 2 MG/ML
4 INJECTION INTRAMUSCULAR; INTRAVENOUS
Status: COMPLETED | OUTPATIENT
Start: 2024-04-03 | End: 2024-04-03

## 2024-04-03 RX ORDER — MIDAZOLAM HYDROCHLORIDE 2 MG/2ML
2 INJECTION, SOLUTION INTRAMUSCULAR; INTRAVENOUS
Status: DISCONTINUED | OUTPATIENT
Start: 2024-04-03 | End: 2024-04-03 | Stop reason: HOSPADM

## 2024-04-03 RX ORDER — FENTANYL CITRATE 50 UG/ML
100 INJECTION, SOLUTION INTRAMUSCULAR; INTRAVENOUS
Status: DISCONTINUED | OUTPATIENT
Start: 2024-04-03 | End: 2024-04-03 | Stop reason: HOSPADM

## 2024-04-03 RX ADMIN — SODIUM CHLORIDE, SODIUM LACTATE, POTASSIUM CHLORIDE, AND CALCIUM CHLORIDE: 600; 310; 30; 20 INJECTION, SOLUTION INTRAVENOUS at 09:21

## 2024-04-03 RX ADMIN — DEXAMETHASONE SODIUM PHOSPHATE 10 MG: 10 INJECTION INTRAMUSCULAR; INTRAVENOUS at 09:41

## 2024-04-03 RX ADMIN — ONDANSETRON 4 MG: 2 INJECTION INTRAMUSCULAR; INTRAVENOUS at 10:40

## 2024-04-03 RX ADMIN — SODIUM CHLORIDE, SODIUM LACTATE, POTASSIUM CHLORIDE, AND CALCIUM CHLORIDE: 600; 310; 30; 20 INJECTION, SOLUTION INTRAVENOUS at 07:55

## 2024-04-03 RX ADMIN — Medication 3 MG: at 10:04

## 2024-04-03 RX ADMIN — FENTANYL CITRATE 50 MCG: 50 INJECTION, SOLUTION INTRAMUSCULAR; INTRAVENOUS at 09:47

## 2024-04-03 RX ADMIN — MIDAZOLAM 2 MG: 1 INJECTION INTRAMUSCULAR; INTRAVENOUS at 09:29

## 2024-04-03 RX ADMIN — FENTANYL CITRATE 50 MCG: 50 INJECTION, SOLUTION INTRAMUSCULAR; INTRAVENOUS at 09:34

## 2024-04-03 RX ADMIN — ROCURONIUM BROMIDE 30 MG: 10 INJECTION, SOLUTION INTRAVENOUS at 09:34

## 2024-04-03 RX ADMIN — PROPOFOL 200 MG: 10 INJECTION, EMULSION INTRAVENOUS at 09:34

## 2024-04-03 RX ADMIN — LIDOCAINE HYDROCHLORIDE 80 MG: 20 INJECTION, SOLUTION EPIDURAL; INFILTRATION; INTRACAUDAL; PERINEURAL at 09:34

## 2024-04-03 RX ADMIN — ONDANSETRON 4 MG: 2 INJECTION INTRAMUSCULAR; INTRAVENOUS at 09:41

## 2024-04-03 RX ADMIN — GLYCOPYRROLATE 0.4 MG: 0.2 INJECTION INTRAMUSCULAR; INTRAVENOUS at 10:04

## 2024-04-03 RX ADMIN — ACETAMINOPHEN 1000 MG: 500 TABLET, FILM COATED ORAL at 07:56

## 2024-04-03 ASSESSMENT — PAIN SCALES - GENERAL
PAINLEVEL_OUTOF10: 5

## 2024-04-03 ASSESSMENT — PAIN - FUNCTIONAL ASSESSMENT: PAIN_FUNCTIONAL_ASSESSMENT: 0-10

## 2024-04-03 ASSESSMENT — PAIN DESCRIPTION - DESCRIPTORS: DESCRIPTORS: CRAMPING

## 2024-04-03 ASSESSMENT — PAIN DESCRIPTION - PAIN TYPE: TYPE: SURGICAL PAIN

## 2024-04-03 ASSESSMENT — PAIN DESCRIPTION - ORIENTATION: ORIENTATION: ANTERIOR

## 2024-04-03 ASSESSMENT — LIFESTYLE VARIABLES: SMOKING_STATUS: 1

## 2024-04-03 ASSESSMENT — PAIN DESCRIPTION - LOCATION: LOCATION: ABDOMEN

## 2024-04-03 NOTE — PERIOP NOTE
Dr. Vianca LIZAMA  at bedside with patient. Pt VSS. Pain and nausea controlled at this time. Verbal sign out per MD when pacu care complete. Plan of care continues.

## 2024-04-03 NOTE — OP NOTE
[facility]  [address], [place] [address]  [phone]    Rafael Child MD, FACOG  Yolanda Grijalva [facility]  Parul Holloway MD, FACOG    Nadine Kaba  1995      Preop Dx:   1. Pelvic pain    2. Dyspareunia    3. Dysmenorrhea     4. Menorrhagia with regular cycle    Postop Dx:   Same     5. Suspected adenomyosis    6. Pelvic adhesive disease    Procedure:   1. Diagnostic laparoscopy    2. Laparoscopic lysis of adhesions (>50% of total case time)    Surgeon:  Danyell      Anesthesia:  GETA    EBL:  2 mL     IVF:  800 mL       Complications:  None    Findings:  Boggy, blanching uterine fundus C/W adenomyosis.  Bilateral tubes adhered to ipsilateral sidewalls limiting mobility of both tubes/ovaries.  Large bowel adhered to left adnexa also.    Procedure:  Patient was taken to the operating room where GETA anesthesia was found to be adequate.  She was prepped and draped in the usual sterile fashion and placed in the dorsal lithotomy position in the Moe boots.  A weighted speculum was placed in the patient's vagina and anterior lip on the cervix grasped with a single toothed tenaculum.  Cervix was sequentially dilated with Hegar dilators to a #8.  Myrna manipulator was introduced in the usual fashion without difficulty.  Attention was then turned to the patient's abdomen.  After local infiltration with 0.5% marcaine, an infraumbilical skin incision was made with the scalpel.  Veress needle was introduced.  Intraabdominal placement confirmed with drop in pressure.  Pneumopretioneum was then obtained with insufflation of CO2 gas.  Bladeless 5 mm trocar was placed intraabdominally under direct visualization with the laparoscope.  Survey of the patient's pelvis and abdomen revealed the above findings.  A second and third bladeless 5 mm trocars were placed in the midline suprapubically  and RLQ under direct visualization with the laparoscope.  Blunt probe and lap scissors were used

## 2024-04-03 NOTE — INTERVAL H&P NOTE
I have examined and spoken with the patient this morning.  She has no new medical issues or complaints.  She reports no new medicines since H&P.   She again understands procedure, risks/benefits and agrees to proceed.  Rafael Child MD  7:52 AM  04/03/24

## 2024-04-03 NOTE — ANESTHESIA PRE PROCEDURE
Department of Anesthesiology  Preprocedure Note       Name:  Nadine Kaba   Age:  28 y.o.  :  1995                                          MRN:  [id number]         Date:  4/3/2024      Surgeon: Surgeon(s):  Rafael Child MD    Procedure: Procedure(s):  Diagnostic Laparoscopy, any other indicated procedures    Medications prior to admission:   Prior to Admission medications    Medication Sig Start Date End Date Taking? Authorizing Provider   famotidine (PEPCID) 40 MG tablet Take 1 tablet by mouth at bedtime 24   Provider, MD Maxwell   etonogestrel-ethinyl estradiol (NUVARING) 0.12-0.015 MG/24HR vaginal ring Place 1 each vaginally every 21 days Insert one (1) ring vaginally and leave in place for three (3) weeks, then remove for one (1) week.  Patient not taking: Reported on 2024   Yolanda Grijalva APRN - CNP   ondansetron (ZOFRAN) 4 MG tablet Take 1 tablet by mouth every 8 hours as needed for Vomiting 23   Yolanda Grijalva APRN - CNP   promethazine (PHENERGAN) 12.5 MG tablet Take 2 tablets by mouth every 8 hours as needed for Nausea 22   Cheo Dickerson MD       Current medications:    Current Facility-Administered Medications   Medication Dose Route Frequency Provider Last Rate Last Admin   • sodium chloride flush 0.9 % injection 5-40 mL  5-40 mL IntraVENous 2 times per day Rafael Child MD       • sodium chloride flush 0.9 % injection 5-40 mL  5-40 mL IntraVENous PRN Rafael Child MD       • 0.9 % sodium chloride infusion   IntraVENous PRN Rafael Child MD       • fentaNYL (SUBLIMAZE) injection 100 mcg  100 mcg IntraVENous Once PRN Anthony Coley MD       • lactated ringers IV soln infusion   IntraVENous Continuous Anthony Coley  mL/hr at 24 0755 New Bag at 24 0755   • sodium chloride flush 0.9 % injection 5-40 mL  5-40 mL IntraVENous 2 times per day Anthony Coley MD       • sodium

## 2024-04-03 NOTE — DISCHARGE INSTRUCTIONS
After general anesthesia or intravenous sedation, for 24 hours or while taking prescription Narcotics:  Limit your activities  A responsible adult needs to be with you for the next 24 hours  Do not drive and operate hazardous machinery  Do not make important personal or business decisions  Do not drink alcoholic beverages  If you have not urinated within 8 hours after discharge, and you are experiencing discomfort from urinary retention, please go to the nearest ED.  If you have sleep apnea and have a CPAP machine, please use it for all naps and sleeping.  Please use caution when taking narcotics and any of your home medications that may cause drowsiness.  *  Please give a list of your current medications to your Primary Care Provider.  *  Please update this list whenever your medications are discontinued, doses are      changed, or new medications (including over-the-counter products) are added.  *  Please carry medication information at all times in case of emergency situations.    These are general instructions for a healthy lifestyle:  No smoking/ No tobacco products/ Avoid exposure to second hand smoke  Surgeon General's Warning:  Quitting smoking now greatly reduces serious risk to your health.  Obesity, smoking, and sedentary lifestyle greatly increases your risk for illness  A healthy diet, regular physical exercise & weight monitoring are important for maintaining a healthy lifestyle    You may be retaining fluid if you have a history of heart failure or if you experience any of the following symptoms:  Weight gain of 3 pounds or more overnight or 5 pounds in a week, increased swelling in our hands or feet or shortness of breath while lying flat in bed.  Please call your doctor as soon as you notice any of these symptoms; do not wait until your next office visit.

## 2024-04-03 NOTE — ANESTHESIA POSTPROCEDURE EVALUATION
Department of Anesthesiology  Postprocedure Note    Patient: Nadine Kaba  MRN: [id number]  YOB: 1995  Date of evaluation: 4/3/2024    Procedure Summary       Date: 04/03/24 Room / Location: [facility] MAIN OR  / [facility] MAIN OR    Anesthesia Start: 0921 Anesthesia Stop: 1019    Procedure: Diagnostic Laparoscopy, LYSIS OF ADHESIONS (Abdomen) Diagnosis:       Pelvic pain      Menorrhagia with regular cycle      Dyspareunia in female      Dysmenorrhea      (Pelvic pain [R10.2])      (Menorrhagia with regular cycle [N92.0])      (Dyspareunia in female [N94.10])      (Dysmenorrhea [N94.6])    Surgeons: Rafael Child MD Responsible Provider: Anthony Coley MD    Anesthesia Type: General ASA Status: 2            Anesthesia Type: General    Juan Phase I: Juan Score: 10    Juan Phase II: Juan Score: 10    Anesthesia Post Evaluation    Patient location during evaluation: PACU  Patient participation: complete - patient participated  Level of consciousness: awake and alert  Airway patency: patent  Nausea & Vomiting: no nausea and no vomiting  Cardiovascular status: hemodynamically stable  Respiratory status: acceptable, nonlabored ventilation and spontaneous ventilation  Hydration status: euvolemic  Comments: /67   Pulse 50   Temp 97.4 °F (36.3 °C) (Temporal)   Resp 16   Ht 1.651 m (5' 5\")   Wt 76.7 kg (169 lb 1.6 oz)   LMP 03/23/2024   SpO2 100%   BMI 28.14 kg/m²     Multimodal analgesia pain management approach  Pain management: adequate and satisfactory to patient    No notable events documented.

## 2024-04-16 PROBLEM — Z09 POSTOPERATIVE EXAMINATION: Status: ACTIVE | Noted: 2024-04-16

## 2024-04-22 ENCOUNTER — OFFICE VISIT (OUTPATIENT)
Dept: OBGYN CLINIC | Age: 29
End: 2024-04-22
Payer: MEDICAID

## 2024-04-22 VITALS
SYSTOLIC BLOOD PRESSURE: 108 MMHG | DIASTOLIC BLOOD PRESSURE: 66 MMHG | WEIGHT: 170 LBS | HEIGHT: 65 IN | BODY MASS INDEX: 28.32 KG/M2

## 2024-04-22 DIAGNOSIS — F17.210 CIGARETTE SMOKER: ICD-10-CM

## 2024-04-22 DIAGNOSIS — Z09 POSTOPERATIVE EXAMINATION: Primary | ICD-10-CM

## 2024-04-22 DIAGNOSIS — N73.6 PELVIC ADHESIVE DISEASE: ICD-10-CM

## 2024-04-22 DIAGNOSIS — N80.03 ADENOMYOSIS OF UTERUS: ICD-10-CM

## 2024-04-22 PROCEDURE — 99024 POSTOP FOLLOW-UP VISIT: CPT | Performed by: OBSTETRICS & GYNECOLOGY

## 2024-04-22 PROCEDURE — 99406 BEHAV CHNG SMOKING 3-10 MIN: CPT | Performed by: OBSTETRICS & GYNECOLOGY

## 2024-04-22 RX ORDER — BUSPIRONE HYDROCHLORIDE 10 MG/1
TABLET ORAL
COMMUNITY
Start: 2024-03-11

## 2024-04-22 RX ORDER — PHENTERMINE HYDROCHLORIDE 37.5 MG/1
TABLET ORAL
COMMUNITY
Start: 2024-03-11

## 2024-04-22 RX ORDER — RELUGOLIX, ESTRADIOL HEMIHYDRATE, AND NORETHINDRONE ACETATE 40; 1; .5 MG/1; MG/1; MG/1
1 TABLET, FILM COATED ORAL DAILY
Qty: 30 TABLET | Refills: 11 | Status: SHIPPED | OUTPATIENT
Start: 2024-04-22

## 2024-04-22 NOTE — PROGRESS NOTES
[facility]  [address], [place] [address]  [phone]    Rafael Child MD, FACOG  Yolanda Grijalva [facility]  Parul Holloway MD, FACOG    2 week Post-Op Note    Ms. Nadine Kaba presents today for 2 week postop check after Dx lap, lap [facility]    She is without complaints today.      Denies any F/C, CP, SOB.    Vitals:    04/22/24 0942   BP: 108/66   Site: Left Upper Arm   Position: Sitting   Weight: 77.1 kg (170 lb)   Height: 1.651 m (5' 5\")          CV - RRR    Lungs - CTA bilat    ABD - soft, approp tend,  port incision C/D/I      Problem List Items Addressed This Visit       Postoperative examination - Primary     Further mgmt per Dx         Pelvic adhesive disease     noted         Cigarette smoker     D/W pt at length neg effects of tobacco abuse including but not limited to: death, multiple forms of CA, COPD, CAD, vascular damage, etc.  Encouraged cessation and D/W her methods (tapering, counseling, nicotine patches/gums, etc.)  Approximately 4 minutes spent in face to face counseling          Adenomyosis of uterus      D/W pt findings, etiology and treatment options               Rafael Child MD   10:01 AM  04/22/24

## 2024-04-22 NOTE — PATIENT INSTRUCTIONS
STOP SMOKING!!  You can start your new medicine today and take it daily as prescribed.  Please come back in 3-4 months for a recheck  Thanks for coming to see us today and letting us take care of you!

## 2024-04-22 NOTE — ASSESSMENT & PLAN NOTE
D/W pt findings, etiology and treatment options - pt has failed OCPs, Nuvaring  Will trial Myfembree and follow results

## 2024-04-22 NOTE — PROGRESS NOTES
Pt comes in today for 2wk post op. Pt had Diagnostic Laparoscopy, LYSIS OF ADHESIONS on 04/03/2024.      LAST PAP:  09/26/2023 negative      LAST MAMMO:  never      LMP:  Patient's last menstrual period was 04/16/2024 (exact date).    BIRTH CONTROL:  none    TOBACCO USE:  yes     FAMILY HISTORY OF:   Breast Cancer:  Yes   Ovarian Cancer:  No   Uterine Cancer:  No   Colon Cancer:  No    Vitals:    04/22/24 0942   BP: 108/66   Site: Left Upper Arm   Position: Sitting   Weight: 77.1 kg (170 lb)   Height: 1.651 m (5' 5\")        Nadia Callahan MA  04/22/24  9:49 AM

## 2024-05-03 ENCOUNTER — TELEPHONE (OUTPATIENT)
Dept: OBGYN CLINIC | Age: 29
End: 2024-05-03

## 2024-05-03 NOTE — TELEPHONE ENCOUNTER
Called patient to let her know that her myfembree medication got approved from 5/2/2024-8/2/2024.   No answer, LVM with no details given.     Sent [employer] message with details.

## 2024-05-16 PROBLEM — Z09 POSTOPERATIVE EXAMINATION: Status: RESOLVED | Noted: 2024-04-16 | Resolved: 2024-05-16
